# Patient Record
Sex: MALE | Race: WHITE | URBAN - METROPOLITAN AREA
[De-identification: names, ages, dates, MRNs, and addresses within clinical notes are randomized per-mention and may not be internally consistent; named-entity substitution may affect disease eponyms.]

---

## 2018-02-27 ENCOUNTER — TRANSFERRED RECORDS (OUTPATIENT)
Dept: HEALTH INFORMATION MANAGEMENT | Facility: CLINIC | Age: 28
End: 2018-02-27

## 2018-04-05 ENCOUNTER — TRANSFERRED RECORDS (OUTPATIENT)
Dept: HEALTH INFORMATION MANAGEMENT | Facility: CLINIC | Age: 28
End: 2018-04-05

## 2018-05-07 ENCOUNTER — TRANSFERRED RECORDS (OUTPATIENT)
Dept: HEALTH INFORMATION MANAGEMENT | Facility: CLINIC | Age: 28
End: 2018-05-07

## 2018-06-28 ENCOUNTER — TRANSFERRED RECORDS (OUTPATIENT)
Dept: HEALTH INFORMATION MANAGEMENT | Facility: CLINIC | Age: 28
End: 2018-06-28

## 2018-08-14 ENCOUNTER — DOCUMENTATION ONLY (OUTPATIENT)
Dept: GASTROENTEROLOGY | Facility: CLINIC | Age: 28
End: 2018-08-14

## 2018-08-14 NOTE — PROGRESS NOTES
GI notes or primary provider notes related to GI problem:   GI Note - [facility] [place] tab and Care Everywhere     Pathology reports:  Y    Recent Lab  Reports: Y    Radiology Reports (CT?MRI) : N    Endoscopy:  Y    Colonoscopy: Y    Referring GI Physician Name: N/A    Referring PCP Name: N/A        Referral Date: 7/30/18 - records received 8/8/18 - self referral - Chronic diarrhea    Date Complete Records Received and sent for review: 8/14/18    Date records scanned into epic: 8/14/18    Provider Review Date:     Date review routed back to sender:     Letter sent:       Notes:

## 2018-08-31 NOTE — PROGRESS NOTES
REFERRAL REVIEW FORM    Referred by: patient    Reason for referral: chronic diarrhea    Date records reviewed: 8/31/2018    Previous work up:    Cscope, egd, GI office visit x 1 (2/2018)    Patient has chronic diarrhea. Duod bx neg for CD; HP and Lynne's esophagus noted; colon bx neg for microscopic colitis. panc elastase normal.     Recommendation:    Schedule with general GI provider     When to schedule:  Next available slot

## 2018-09-17 ENCOUNTER — TELEPHONE (OUTPATIENT)
Dept: GASTROENTEROLOGY | Facility: CLINIC | Age: 28
End: 2018-09-17

## 2018-09-18 ENCOUNTER — OFFICE VISIT (OUTPATIENT)
Dept: GASTROENTEROLOGY | Facility: CLINIC | Age: 28
End: 2018-09-18
Payer: COMMERCIAL

## 2018-09-18 VITALS
TEMPERATURE: 97.9 F | DIASTOLIC BLOOD PRESSURE: 87 MMHG | SYSTOLIC BLOOD PRESSURE: 128 MMHG | BODY MASS INDEX: 29.91 KG/M2 | HEART RATE: 86 BPM | OXYGEN SATURATION: 98 % | RESPIRATION RATE: 16 BRPM | WEIGHT: 220.8 LBS | HEIGHT: 72 IN

## 2018-09-18 DIAGNOSIS — R10.84 ABDOMINAL PAIN, GENERALIZED: ICD-10-CM

## 2018-09-18 DIAGNOSIS — Z71.3 NUTRITIONAL COUNSELING: Primary | ICD-10-CM

## 2018-09-18 DIAGNOSIS — K52.9 CHRONIC DIARRHEA: Primary | ICD-10-CM

## 2018-09-18 DIAGNOSIS — K52.9 CHRONIC DIARRHEA: ICD-10-CM

## 2018-09-18 DIAGNOSIS — R79.89 ABNORMAL LIVER FUNCTION TESTS: ICD-10-CM

## 2018-09-18 DIAGNOSIS — R19.5 LOOSE STOOLS: ICD-10-CM

## 2018-09-18 LAB
ALBUMIN SERPL-MCNC: 3.9 G/DL (ref 3.4–5)
ALP SERPL-CCNC: 90 U/L (ref 40–150)
ALT SERPL W P-5'-P-CCNC: 184 U/L (ref 0–70)
ANION GAP SERPL CALCULATED.3IONS-SCNC: 6 MMOL/L (ref 3–14)
AST SERPL W P-5'-P-CCNC: 107 U/L (ref 0–45)
BASOPHILS # BLD AUTO: 0 10E9/L (ref 0–0.2)
BASOPHILS NFR BLD AUTO: 0.5 %
BILIRUB DIRECT SERPL-MCNC: 0.1 MG/DL (ref 0–0.2)
BILIRUB SERPL-MCNC: 0.5 MG/DL (ref 0.2–1.3)
BUN SERPL-MCNC: 18 MG/DL (ref 7–30)
CALCIUM SERPL-MCNC: 9 MG/DL (ref 8.5–10.1)
CHLORIDE SERPL-SCNC: 106 MMOL/L (ref 94–109)
CO2 SERPL-SCNC: 26 MMOL/L (ref 20–32)
CREAT SERPL-MCNC: 1.26 MG/DL (ref 0.66–1.25)
CRP SERPL-MCNC: 3.3 MG/L (ref 0–8)
DEPRECATED CALCIDIOL+CALCIFEROL SERPL-MC: 28 UG/L (ref 20–75)
DIFFERENTIAL METHOD BLD: NORMAL
EOSINOPHIL # BLD AUTO: 0.1 10E9/L (ref 0–0.7)
EOSINOPHIL NFR BLD AUTO: 1.2 %
ERYTHROCYTE [DISTWIDTH] IN BLOOD BY AUTOMATED COUNT: 12.4 % (ref 10–15)
ERYTHROCYTE [SEDIMENTATION RATE] IN BLOOD BY WESTERGREN METHOD: 8 MM/H (ref 0–15)
FERRITIN SERPL-MCNC: 95 NG/ML (ref 26–388)
FOLATE SERPL-MCNC: 12.6 NG/ML
GFR SERPL CREATININE-BSD FRML MDRD: 67 ML/MIN/1.7M2
GLUCOSE SERPL-MCNC: 123 MG/DL (ref 70–99)
HAV IGG SER QL IA: NONREACTIVE
HBV CORE AB SERPL QL IA: NONREACTIVE
HBV SURFACE AB SERPL IA-ACNC: 20.45 M[IU]/ML
HBV SURFACE AG SERPL QL IA: NONREACTIVE
HCT VFR BLD AUTO: 42.5 % (ref 40–53)
HCV AB SERPL QL IA: NONREACTIVE
HGB BLD-MCNC: 14.3 G/DL (ref 13.3–17.7)
IGA SERPL-MCNC: 119 MG/DL (ref 70–380)
IMM GRANULOCYTES # BLD: 0 10E9/L (ref 0–0.4)
IMM GRANULOCYTES NFR BLD: 0.4 %
LIPASE SERPL-CCNC: 113 U/L (ref 73–393)
LYMPHOCYTES # BLD AUTO: 0.8 10E9/L (ref 0.8–5.3)
LYMPHOCYTES NFR BLD AUTO: 9.9 %
MCH RBC QN AUTO: 30.2 PG (ref 26.5–33)
MCHC RBC AUTO-ENTMCNC: 33.6 G/DL (ref 31.5–36.5)
MCV RBC AUTO: 90 FL (ref 78–100)
MONOCYTES # BLD AUTO: 0.4 10E9/L (ref 0–1.3)
MONOCYTES NFR BLD AUTO: 4.8 %
NEUTROPHILS # BLD AUTO: 6.8 10E9/L (ref 1.6–8.3)
NEUTROPHILS NFR BLD AUTO: 83.2 %
NRBC # BLD AUTO: 0 10*3/UL
NRBC BLD AUTO-RTO: 0 /100
PLATELET # BLD AUTO: 196 10E9/L (ref 150–450)
POTASSIUM SERPL-SCNC: 4.4 MMOL/L (ref 3.4–5.3)
PROT SERPL-MCNC: 7.8 G/DL (ref 6.8–8.8)
RBC # BLD AUTO: 4.74 10E12/L (ref 4.4–5.9)
SODIUM SERPL-SCNC: 137 MMOL/L (ref 133–144)
TSH SERPL DL<=0.005 MIU/L-ACNC: 2.18 MU/L (ref 0.4–4)
VIT B12 SERPL-MCNC: 1023 PG/ML (ref 193–986)
WBC # BLD AUTO: 8.2 10E9/L (ref 4–11)

## 2018-09-18 RX ORDER — CALCIUM CARBONATE 500 MG/1
TABLET, CHEWABLE ORAL
COMMUNITY

## 2018-09-18 RX ORDER — ACETAMINOPHEN 325 MG/1
TABLET ORAL
COMMUNITY

## 2018-09-18 ASSESSMENT — ENCOUNTER SYMPTOMS
ARTHRALGIAS: 1
INSOMNIA: 1
HALLUCINATIONS: 0
FATIGUE: 1
EYE WATERING: 0
INCREASED ENERGY: 1
DYSPNEA ON EXERTION: 0
WEIGHT LOSS: 0
CHILLS: 1
SINUS CONGESTION: 1
TASTE DISTURBANCE: 0
VOMITING: 0
POSTURAL DYSPNEA: 0
SPUTUM PRODUCTION: 0
MUSCLE CRAMPS: 1
NECK MASS: 0
POLYPHAGIA: 0
WHEEZING: 1
EYE REDNESS: 0
HOARSE VOICE: 1
RECTAL PAIN: 0
SNORES LOUDLY: 1
NAUSEA: 1
WEIGHT GAIN: 0
BLOATING: 0
DECREASED APPETITE: 0
NECK PAIN: 1
SORE THROAT: 1
HEMOPTYSIS: 0
COUGH: 1
BLOOD IN STOOL: 0
POLYDIPSIA: 0
NIGHT SWEATS: 0
STIFFNESS: 1
MUSCLE WEAKNESS: 1
CONSTIPATION: 0
TROUBLE SWALLOWING: 1
SMELL DISTURBANCE: 0
DECREASED CONCENTRATION: 1
MYALGIAS: 1
COUGH DISTURBING SLEEP: 0
DOUBLE VISION: 0
SHORTNESS OF BREATH: 0
DIARRHEA: 1
JAUNDICE: 0
ALTERED TEMPERATURE REGULATION: 1
HEARTBURN: 1
PANIC: 1
JOINT SWELLING: 1
BOWEL INCONTINENCE: 0
DEPRESSION: 1
SINUS PAIN: 1
EYE IRRITATION: 0
FEVER: 0
BACK PAIN: 1
EYE PAIN: 0
ABDOMINAL PAIN: 0
NERVOUS/ANXIOUS: 1

## 2018-09-18 ASSESSMENT — PATIENT HEALTH QUESTIONNAIRE - PHQ9
SUM OF ALL RESPONSES TO PHQ QUESTIONS 1-9: 13
10. IF YOU CHECKED OFF ANY PROBLEMS, HOW DIFFICULT HAVE THESE PROBLEMS MADE IT FOR YOU TO DO YOUR WORK, TAKE CARE OF THINGS AT HOME, OR GET ALONG WITH OTHER PEOPLE: SOMEWHAT DIFFICULT
SUM OF ALL RESPONSES TO PHQ QUESTIONS 1-9: 13

## 2018-09-18 ASSESSMENT — PAIN SCALES - GENERAL: PAINLEVEL: NO PAIN (0)

## 2018-09-18 NOTE — PROGRESS NOTES
"[facility] Outpatient Medical Nutrition Therapy      Time Spent:  60 minutes  Session Type:  Initial Individual Session  Referring Physician:  Dr. Simon Pérez  Reason for RD Visit:  Intermittent abdominal pain and loose stool, possible IBS per conversation with MD.     Nutrition Assessment:  Patient is here with his wife and 6-month old son for initial visit with Registered Dietitian (RD).  Patient is a 28 y.o. male with history of CKD, H. Pylori and intermittent abdominal pain and loose stool for the past 5 years. Patient stated that he will go through periods where he has pain and loose stool over a variable amount of time. For example may have these issues for 3 days, 1 week, 1 month or 3 months and then will be fine for a period of time. He reported that his symptoms are not painful as much as uncomfortable. His stomach starts \"rolling\"/gurgling and he gets sweaty, needs to stand up and then needs to have bowel movement. He stated that once he has a bowel movement, he does feel better. He stated that occasionally he has stool that looks oily. He stated that he and his wife have noticed that when he eats pasta at a restaurant, this tends to be a trigger for his GI symptoms. On further assessment, he tends to have corine sauce on pasta at a restaurant over tomato sauce. In general they have noticed that he does tends to have GI issues/these symptoms when eating out but also these periods of loose stool can happen at any time.     Patient stated that he tends to skip breakfast but if he does eat something for breakfast during the week, then has a muffin or donuts. Once per week on weekends will go out to eat for breakfast. He tends to have fast food for lunch meals. 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 Pt stated that he tends to drink around 1-4 fountain sodas (mellow yellow) per day and about 24 oz water/day and will have about 2-4 " "alcoholic drinks per week.     Height:   Ht Readings from Last 1 Encounters:   09/18/18 1.825 m (5' 11.85\")     Weight:    Wt Readings from Last 5 Encounters:   09/18/18 100.2 kg (220 lb 12.8 oz)      BMI: 30.13    Diet Recall:  (usual intake)  Meal Food    Breakfast Skips OR Muffin or donuts OR sundays: restaurant: eggs, ham,. 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 Occasionally apple pie and ice cream in the evening lately   Beverages 1-4 [place] soda (mellow yellow), 24 oz water/day, 0-2 glasses 1% milk/day   Alcohol Intake On weekends usually 1-2 beers or whiskey with coke drinks      Frequency of eating/taking out meals: tends to eat out for lunch fast food     Labs:  Pt will have labs following visit today.  Pertinent Medications/vitamin and mineral supplements:   Omeprazole and TUMS prn. Pt will be starting citrucel as recommended at MD visit today.  Food Allergies:  NKFA  Physical Activity:  Active as part of workday. No structured exercise.    MALNUTRITION:  % Weight Loss:  None noted  % Intake:  No decreased intake noted  Subcutaneous Fat Loss:  None observed  Muscle Loss:  None observed  Fluid Retention:  None noted    Malnutrition Diagnosis: Patient does not meet two of the above criteria necessary for diagnosing malnutrition  In Context of:  Chronic illness or disease    Nutrition Diagnosis:    Altered GI function related to intermittent issues with abdominal pain/discomfort and loose stool as evidenced by pt report.    Food and nutrition related knowledge deficit related to lack of previous diet education as evidenced by pt report.    Nutrition Prescription: Recommended trial of dairy free diet for 3-4 weeks along with keeping " food and beverage journals and tracking GI symptoms. See all goals below.    Nutrition Intervention:    Nutrition Education/Counseling:  Provided diet education for following a dairy free diet. Reviewed sources of dairy food and beverages and gave tips for eliminating and making substitutions with non dairy foods/beverages based on some of pt's preferences. Discussed other tips and suggestions for meals/snacks/beverages that may be better tolerated. Encouraged pt to eat slowly and take 20-30 minutes to eat a meal and stop eating when satisfied not full, decrease carbonated drinks down to 12 oz or less per day. Also encouraged pt to increase water to drink 48 oz-64 oz per day and discussed tips for naturally flavoring water such as adding in lemon/lime for flavor. Recommended pt not skip meals and eat a more balanced breakfast meal. Explained that skipping may lead to overeating at lunch and dinner meal. Encouraged pt to pack more lunches from home instead of eating out at fast food restaurants. Encouraged him to increase fruit and vegetable intake at each meal. Recommended keep daily food and beverage journals and track all GI symptoms he is experiencing.     Nutrition Education: Provided nutrition education on general digestion of carbohydrates, FODMAP (Fermentable Oligo-saccharides, Di-saccharides, Mono-saccharides And Polyols) foods, impact these short chain carbohydrates on GI tract, Gut microbiota and its effects on fiber. Discussed high and low FODMAP foods. Reviewed FODMAP diet guidelines, including length of the diet,  the different phases of the diet plan with elimination phase and discussed recommendation and schedule for adding foods back in. Explained that pt could follow a modified lower FODMAP diet if preferred. For Modified lower FODMAP diet, review the list of high FODMAP foods and then eliminate 1-2 of those high FODMAP foods you eat daily/most days to see if any improvement in GI issues. (i.e.  onions).    Keep daily food and beverage journals and track all GI symptoms (diarrhea, constipation, belching, bloating, pain) to identify the threshold for High FODMAP foods and/or tolerance or intolerance to foods.     Patient expressed understanding of diet education and interested in focusing on highlighted goals below.    Educational Materials Provided:  Nutrition care manual: Low FODMAP nutrition therapy and fodmap food list. Lactose controlled nutrition therapy as well as lactose free cooking tips.    Patient verbalized understanding of education provided. See all Goals below.     Goals:  -Trial dairy free diet for 3-4 weeks.   -After that 3-4 weeks period, can try adding back in lower lactose dairy products at first such as 1/2 c cottage cheese or 1 oz aged cheese such as swiss or cheddar. Only eat 1 serving of that low lactose dairy food per day for 3-4 days to see if you tolerate or notice any symptoms.  -Keep daily food and beverage journals and track all GI symptoms.  -Decrease carbonated drinks/soda down to no more than one 12 oz drink per day.  -Increase water to drink 48 oz-64 oz (6 oz-8 oz).  -Eat slowly and take 20-30 minutes to eat a meal. Stop eating when satisfied not full.  -Increase fruit and vegetables at meal and aim to have 1-2 servings with each meal.  -Do not skip breakfast meal.  -Can eat 4-6 smaller meals if better tolerated.    At a later time if you do want to follow a low FODMAP diet see below:    Follow a low FODMAP diet. Start with eliminating higher FODMAP foods for 3-4 weeks. Then after 3-4 weeks start adding those higher FODMAP foods back into diet. Start adding 1-2 of your favorite higher FODMAP foods for 3-4 days (eat daily during those 3-4 days). Then add another 1-2 higher FODMAP foods and onward. All along the reintroduction make sure to track GI symptoms daily as well as tracking GI symptoms during the elimination phase.     Or     -For Modified lower FODMAP diet, review  list of high FODMAP foods and then eliminate 1-2 of those high FODMAP foods you eat daily/most days to see if any improvement in GI issues. (ie milk).    Nutrition Monitoring and Evaluation: Will monitor adherence to nutrition recommendations at any future RD visits.     Further Medical Nutrition Therapy:  Recommended when here for other f/u visits or PRN  Next Appointment (if applicable):  Gave pt scheduling info/number  Patient was encouraged to call/contact RD with any further questions.    Naz Arriola, MS, RD, LD

## 2018-09-18 NOTE — LETTER
Patient:  Sathya Quintana  :   1990  MRN:     [id number]        Mr.Brandon ZENAIDA Quintana  [id number] [facility] [id number]        2018    Dear ,    We are writing to inform you of your test results. Overall your initial labs look good. There is no evidence of any nutritional deficiencies and your blood counts look good. Your kidney function is still a little decreased but better than it was previously. Your inflammatory markers are normal. Your blood markers for celiac are negative (this is normal and means you likely do not have celiac disease). Your thyroid test and pancreas tests are normal. Your tests for viral hepatitis are normal.    Two of your liver tests (the ALT and AST) are elevated or abnormal. This can indicate that there is some inflammation in your liver. Sometimes this can be due to fat in your liver or another process. I recommend we get an ultrasound of your liver and some additional blood tests when you are back in the [facility] to get the rest of your tests to further evaluate this.    If you have any questions please do not hesitate to call my nurse coordinator, Sonia Banda, at [phone].    Sincerely,  Simon Pérez MD    [facility]  Division of Gastroenterology, Hepatology and Nutrition      Resulted Orders   CBC with platelets differential   Result Value Ref Range    WBC 8.2 4.0 - 11.0 10e9/L    RBC Count 4.74 4.4 - 5.9 10e12/L    Hemoglobin 14.3 13.3 - 17.7 g/dL    Hematocrit 42.5 40.0 - 53.0 %    MCV 90 78 - 100 fl    MCH 30.2 26.5 - 33.0 pg    MCHC 33.6 31.5 - 36.5 g/dL    RDW 12.4 10.0 - 15.0 %    Platelet Count 196 150 - 450 10e9/L    Diff Method Automated Method     % Neutrophils 83.2 %    % Lymphocytes 9.9 %    % Monocytes 4.8 %    % Eosinophils 1.2 %    % Basophils 0.5 %    % Immature Granulocytes 0.4 %    Nucleated RBCs 0 0 /100    Absolute Neutrophil 6.8 1.6 - 8.3 10e9/L    Absolute Lymphocytes 0.8 0.8 -  5.3 10e9/L    Absolute Monocytes 0.4 0.0 - 1.3 10e9/L    Absolute Eosinophils 0.1 0.0 - 0.7 10e9/L    Absolute Basophils 0.0 0.0 - 0.2 10e9/L    Abs Immature Granulocytes 0.0 0 - 0.4 10e9/L    Absolute Nucleated RBC 0.0    Lipase   Result Value Ref Range    Lipase 113 73 - 393 U/L   Erythrocyte sedimentation rate auto   Result Value Ref Range    Sed Rate 8 0 - 15 mm/h   CRP inflammation   Result Value Ref Range    CRP Inflammation 3.3 0.0 - 8.0 mg/L   Ferritin   Result Value Ref Range    Ferritin 95 26 - 388 ng/mL   Hepatic panel   Result Value Ref Range    Bilirubin Direct 0.1 0.0 - 0.2 mg/dL    Bilirubin Total 0.5 0.2 - 1.3 mg/dL    Albumin 3.9 3.4 - 5.0 g/dL    Protein Total 7.8 6.8 - 8.8 g/dL    Alkaline Phosphatase 90 40 - 150 U/L     (H) 0 - 70 U/L     (H) 0 - 45 U/L   Vitamin D Deficiency   Result Value Ref Range    Vitamin D Deficiency screening 28 20 - 75 ug/L      Comment:      Season, race, dietary intake, and treatment affect the concentration of   25-hydroxy-Vitamin D. Values may decrease during winter months and increase   during summer months. Values 20-29 ug/L may indicate Vitamin D insufficiency   and values <20 ug/L may indicate Vitamin D deficiency.  Vitamin D determination is routinely performed by an immunoassay specific for   25 hydroxyvitamin D3.  If an individual is on vitamin D2 (ergocalciferol)   supplementation, please specify 25 OH vitamin D2 and D3 level determination by   LCMSMS test [id number].     Vitamin B12   Result Value Ref Range    Vitamin B12 1023 (H) 193 - 986 pg/mL   Folate   Result Value Ref Range    Folate 12.6 >5.4 ng/mL   TSH   Result Value Ref Range    TSH 2.18 0.40 - 4.00 mU/L   Tissue transglutaminase rizwan IgA and IgG   Result Value Ref Range    Tissue Transglutaminase Antibody IgA <1 <7 U/mL      Comment:      Negative  The tTG-IgA assay has limited utility for patients with decreased levels of   IgA. Screening for celiac disease should include IgA testing to  rule out   selective IgA deficiency and to guide selection and interpretation of   serological testing. tTG-IgG testing may be positive in celiac disease   patients with IgA deficiency.      Tissue Transglutaminase [place] IgG <1 <7 U/mL      Comment:      Negative   IgA   Result Value Ref Range     70 - 380 mg/dL   Hepatitis A Antibody IgG   Result Value Ref Range    Hepatitis A Antibody IgG Nonreactive NR^Nonreactive      Comment:      This assay cannot be used for the diagnosis of acute HAV infection.   Hepatitis B Surface Antibody   Result Value Ref Range    Hepatitis B Surface Antibody 20.45 (H) <8.00 m[IU]/mL      Comment:      Reactive, Patient is considered to be immune to infection with hepatitis B   when the value is greater than or equal to 12.0 m[IU]/mL.     Hepatitis B surface antigen   Result Value Ref Range    Hep B Surface Agn Nonreactive NR^Nonreactive   Hepatitis C antibody   Result Value Ref Range    Hepatitis C Antibody Nonreactive NR^Nonreactive      Comment:      Assay performance characteristics have not been established for newborns,   infants, and children

## 2018-09-18 NOTE — LETTER
Date:September 28, 2018      Patient was self referred, no letter generated. Do not send.        [facility] Information

## 2018-09-18 NOTE — LETTER
9/18/2018       RE: Sathya Quintana  [id number] [place] [address]     Dear Colleague,    Thank you for referring your patient, Sathya Quintana, to the [facility] GASTROENTEROLOGY AND IBD CLINIC at [facility]. Please see a copy of my visit note below.    Note dictated. Job code [id number].    Simon Pérez MD    [facility]  Division of Gastroenterology, Hepatology and Nutrition    Answers for HPI/ROS submitted by the patient on 9/18/2018   General Symptoms: Yes  Skin Symptoms: No  HENT Symptoms: Yes  EYE SYMPTOMS: Yes  HEART SYMPTOMS: No  LUNG SYMPTOMS: Yes  INTESTINAL SYMPTOMS: Yes  URINARY SYMPTOMS: No  REPRODUCTIVE SYMPTOMS: No  SKELETAL SYMPTOMS: Yes  BLOOD SYMPTOMS: No  NERVOUS SYSTEM SYMPTOMS: No  MENTAL HEALTH SYMPTOMS: Yes  Fever: No  Loss of appetite: No  Weight loss: No  Weight gain: No  Fatigue: Yes  Night sweats: No  Chills: Yes  Increased stress: Yes  Excessive hunger: No  Excessive thirst: No  Feeling hot or cold when others believe the temperature is normal: Yes  Loss of height: No  Post-operative complications: No  Surgical site pain: No  Hallucinations: No  Change in or Loss of Energy: Yes  Hyperactivity: No  Confusion: Yes  Ear pain: Yes  Ear discharge: No  Hearing loss: Yes  Tinnitus: No  Nosebleeds: No  Congestion: Yes  Sinus pain: Yes  Trouble swallowing: Yes   Voice hoarseness: Yes  Mouth sores: Yes  Sore throat: Yes  Tooth pain: No  Gum tenderness: No  Bleeding gums: No  Change in taste: No  Change in sense of smell: No  Dry mouth: No  Hearing aid used: No  Neck lump: No  Eye pain: No  Vision loss: Yes  Dry eyes: No  Watery eyes: No  Eye bulging: No  Double vision: No  Flashing of lights: No  Spots: No  Floaters: No  Redness: No  Crossed eyes: No  Tunnel Vision: No  Yellowing of eyes: No  Eye irritation: No  Cough: Yes  Sputum or phlegm: No  Coughing up blood: No  Difficulty breating or shortness of breath:  No  Snoring: Yes  Wheezing: Yes  Difficulty breathing on exertion: No  Nighttime Cough: No  Difficulty breathing when lying flat: No  Heart burn or indigestion: Yes  Nausea: Yes  Vomiting: No  Abdominal pain: No  Bloating: No  Constipation: No  Diarrhea: Yes  Blood in stool: No  Black stools: No  Rectal or Anal pain: No  Fecal incontinence: No  Yellowing of skin or eyes: No  Vomit with blood: No  Change in stools: Yes  Back pain: Yes  Muscle aches: Yes  Neck pain: Yes  Swollen joints: Yes  Joint pain: Yes  Bone pain: Yes  Muscle cramps: Yes  Muscle weakness: Yes  Joint stiffness: Yes  Bone fracture: No  Nervous or Anxious: Yes  Depression: Yes  Trouble sleeping: Yes  Trouble thinking or concentrating: Yes  Mood changes: Yes  Panic attacks: Yes  PHQ-2 Score: 4  If you checked off any problems, how difficult have these problems made it for you to do your work, take care of things at home, or get along with other people?: Somewhat difficult  PHQ9 TOTAL SCORE: 13      OUTPATIENT GI CONSULT    Service Date: 09/18/2018      REFERRING PROVIDER:  Yogesh Quintana MD      REASON FOR REFERRAL:  Five years of intermittent loose stools.      CHIEF COMPLAINT:  Five years of intermittent lose stools.       HISTORY OF PRESENT ILLNESS:  Sathya is a very pleasant 28-year-old gentleman with a history of CKD from unknown cause who is here for a second opinion regarding 5 years of intermittent loose and urgent stools.      The patient notes that he remembers the day his symptoms began 5 years ago.  He and his wife were visiting his in-laws in [place], and they went out to eat at a restaurant in [facility].  He ate a cheeseburger, and he noticed that after he ate the cheeseburger, he had relatively 2 urgent and explosive bowel movements that were loose and watery.  He has noted ever since then he will have these intermittent loose stools.  They will come and go, and there will be periods of time with normal bowel  movements in between.  For example, he will have anywhere from 1-2 weeks where things will be normal, followed by anywhere from 1-6 weeks where he will be having more loose stools.  When things are normal, he will have 1 [place] stool type 4 per day.  When things are not going well, he will have 1-2 [place] type 7 stools per day.  He typically does not have nocturnal stools.  He has never had any blood in them.  When stools are more watery sometimes he will see that there is an oily film that separates from his stool and rises to the surface of the water.  Symptoms seem to be worse when he undergoes periods of stress, namely if he is in a new area where he does not know where the bathroom is or if he is traveling (ie. coming down to the [facility]).  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      He notes times when he will have urgent BMs after eating.  He has not been able to pinpoint any particular foods that it happens with, as it seems to happen with most foods.  However, his wife does note that he drinks quite a bit of soda pop, particularly Benedicto Yello (at least 1 large bottle a day).  He also does go through episodes when he will drink more milk.  Some days, he can have 2 large glasses of milk per day, and he will do that for an extended period of time.  Other days and other weeks, he may not drink much milk.  He has not noticed that this correlates directly with his symptoms in one way or another.      He denies any weight loss; and in fact, he has gained some weight recently.  He denies any nausea or vomiting.  He does have daily reflux.  He has been on omeprazole in the past, but we will talk about this later.  He denies any rashes, mouth sores, eye pain or eye redness.  No joint pains.      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  He urinated in the bathroom and then felt lightheaded or dizzy and went to lie down in the living room.  His wife then found him and he was somewhat disoriented. She brought him into the bathroom where he had an episode of incontinence.  Shortly after that, he did regain his orientation and felt better.  He has not had any episodes of that since then.  His PCP has evaluated this. He was found to be bradycardic on a loop recorder, and he was diagnosed with a vasovagal episode.      After determining that symptoms are worse with stress, he has recently started seeing a counselor near home to talk about the stress in his life.      He did see a local GI physician in the [facility].  He had stool studies that were unremarkable including infectious studies that were negative.  He did have a spot fecal fat test checked that was elevated, and he had a normal pancreas elastase.  He also had an endoscopy which showed grade B esophagitis.  Biopsies of the irregular Z-line did show some intestinal metaplasia, but he does remember if he was told that he had Lynne's esophagus.  Duodenal biopsies showed no evidence of celiac.  He had a colonoscopy with a normal ileum and a normal colon.  Biopsies of the colon were normal.      He has not tried any fiber.  He did try a little bit of a probiotic but did not take it regularly.  He notes that alcohol does not seem to make his symptoms worse.  He states that he does not drink heavily.      His wife believes that much of this is food intolerance and is related to his diet.      Currently, he has been having a pretty good stretch and feels that he is in a doing well now.      REVIEW OF SYSTEMS:  A complete review of systems was performed.  Pertinent positives and negatives are as stated above HPI.  The remainder of a complete review of systems is unremarkable.      PAST MEDICAL HISTORY:   1.  CKD.  He has been seen by nephrologist in the [facility]  system.  No etiology has been found.  He had worsening of his renal failure when he was treated with triple therapy for H. pylori, and his creatinine seems to be back to baseline at 1.5 now.  His nephrologist hypothesized this was maybe from dehydration related to looser stools from his antibiotic regimen for the H. pylori.   2.  H. pylori.  This was found and biopsies of the stomach.  He is status post treatment with triple therapy, which he did not tolerate very well - this caused some diarrhea and nausea which led to dehydration. This resolved with cessation of therapy   3.  Chronic loose stools as stated above.      PAST SURGICAL HISTORY:  None.      MEDICATIONS:  No regular medications.     1.  He formerly took Advil intermittently and Aleve for headaches, although he has been counseled against this.   2.  Acetaminophen as needed for pain.      FAMILY HISTORY:  Mother has a unknown history of liver issues.  He does not know any details.  A sister with cancer of the kidney, again he does not know any details.  The whole family has heartburn.  No history of Crohn's or ulcerative colitis.  No history of colon cancer or colon polyps.  No history of stomach cancer, esophageal cancer.  No history of celiac that he is aware of.      SOCIAL HISTORY: He is a romero, and he plants corn and beans.  He is accompanied by his wife, Alvin, today and her baby Meeker.  He drinks socially alcohol.  He does not smoke.  He does not use marijuana.      PHYSICAL EXAMINATION:   VITAL SIGNS:  Temperature is 97.9, blood pressure 128/87, pulse 86, respirations 16, satting 98% on room air.   GENERAL:  He is pleasant, in no acute distress.   HEENT:  Head is atraumatic, normocephalic.  Sclerae are clear, anicteric without injection.  Oropharynx is clear with moist mucous membranes.   NECK:  Supple.  There is no lymphadenopathy, no thyromegaly.   LUNGS:  Clear to auscultation.   HEART:  Normal rate.   ABDOMEN:  Soft, nontender,  nondistended, no rebound or guarding.   EXTREMITIES:  No clubbing, cyanosis or edema.   SKIN:  No evidence of rash.   JOINTS:  No synovitis.   NEUROLOGIC:  Awake, alert and oriented x3 with no focal deficits.      LABORATORY DATA:  Reviewed in Care Everywhere.  Fecal fat mildly elevated, although his pancreatic elastase is normal.  CBC is normal.  Differential is normal.  His UA is unremarkable.  He did have mildly elevated protein in his urine.  His INR has been 1.2.  His metabolic panel showed a creatinine baseline 1.5 which has been as high as 1.9, albumin is 4.5, ALT was mildly elevated at 87 in May, AST is normal, alkaline phosphatase is normal, bilirubin has been normal, enteric panel was normal, colonoscopy and endoscopy as stated above.      ASSESSMENT AND PLAN:  Sathya is a 28-year-old gentleman with 5 years of intermittent urgent, loose stools.      1.  Intermittent loose stools with urgency.  Overall, I think his symptoms are most consistent with irritable bowel syndrome compounded food intolerances.  It very well may be that he had a trigger for this irritable bowel syndrome 5 years ago while traveling to the [facility].  He may have had unspecified food poisoning or viral gastroenteritis that subsequently triggered post-infectious IBS.  To that end, we will make sure we are maximizing treatment for IBS.  He will start on fiber and titrate this up to 2-3 tablespoons per day.  He will take Align probiotic regularly.  We will have him meet with the nutritionist today to discuss food triggers.  Notably, he does drink quite a bit of milk and soda.  We will  him on avoiding this.  She will also talk to him about a low FODMAP diet.  He can consider taking Imodium when he is traveling; however, it does not sound like his symptoms are too severe.      Given the fact that he does complain of some oily stools and the fact that he had a spot fecal fat that was elevated, I think this will require a little bit of  a further evaluation.  We will arrange a 24-hour fecal fat collection in addition to evaluating the type of fats in his stool (split fats vs neutral fats).  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  We will also check an MR enterography to look at his small bowel and make sure there is no evidence of inflammatory bowel disease, though this is less likely given that his EGD and colonoscopy are negative.  We will repeat his celiac serologies to make sure there is no evidence of celiac.      As stressors seem to be playing a big role in his symptoms, we will have him meet with our GI psychologist to discuss how his symptoms could be related to stress.     I do note that he has an unspecified CKD for which he has seen nephrology. Also, he has had mildly abnormal LFTs. I am not sure if his loose stools are in anyway related to these other findings in a unifying diagnosis though this is noted. Celiac disease could be a unifying diagnosis but his duodenal biopsies were negative for this.    2.  Possible Lynne's esophagus in the setting of reflux esophagus.  Typically, I would recommend for him to be on PPI for 2 months and then repeat an EGD with biopsies to confirm the presence of Lynne's esophagus or refute it.  Given his CKD, I will have him make sure he talks with his nephrologists to get their okay about starting omeprazole.  If he gets the okay, we will have him do the omeprazole 20 mg daily for 2 months and then repeat an endoscopy.     3.  H. pylori.  I do not think this was contributing to his loose stools in any way.  This is likely an incidental finding.  He had a difficult time with triple therapy.  We will check H. pylori stool antigen to make sure that we have confirmed eradication.      I spent quite a bit of time discussing all of this with the patient.  We answered all questions and encouraged him to call if he has any further questions in  the future.      I did discuss with the patient that I was leaving the [facility] in approximately 2 months.  I will be happy to do his EGD before that time, but if he continues to be seen here at the [facility], he will need to follow up with one of our midlevel providers or with another faculty  provider.  The patient understands this.      Thank you very much for the opportunity to take part in the care of this patient.  Please do not hesitate to call with questions.         SIMON LILLY MD             D: 2018   T: 2018   MT: BRENT      Name:     DALLIN NINO   MRN:      [id number]        Account:      [id number]   :      1990           Service Date: 2018      Document: [id number]      ADDENDUM:  LFTs noted to be more abnormal -  and . We will start work up with labs and US with dopplers. He will get this checked when he returns for his MRE. Further recommendations to follow these results.    Simon Lilly MD    [facility]  Division of Gastroenterology, Hepatology and Nutrition        Again, thank you for allowing me to participate in the care of your patient.      Sincerely,    Simon Lilly MD

## 2018-09-18 NOTE — LETTER
"9/18/2018       RE: Sathya Quintana  [address] [place] [address]     Dear Colleague,    Thank you for referring your patient, Sathya Quintana, to the [facility] GASTROENTEROLOGY AND IBD CLINIC at [facility]. Please see a copy of my visit note below.    [facility] Outpatient Medical Nutrition Therapy      Time Spent:  60 minutes  Session Type:  Initial Individual Session  Referring Physician:  Dr. Simon Pérez  Reason for RD Visit:  Intermittent abdominal pain and loose stool, possible IBS per conversation with MD.     Nutrition Assessment:  Patient is here with his wife and 6-month old son for initial visit with Registered Dietitian (RD).  Patient is a 28 y.o. male with history of CKD, H. Pylori and intermittent abdominal pain and loose stool for the past 5 years. Patient stated that he will go through periods where he has pain and loose stool over a variable amount of time. For example may have these issues for 3 days, 1 week, 1 month or 3 months and then will be fine for a period of time. He reported that his symptoms are not painful as much as uncomfortable. His stomach starts \"rolling\"/gurgling and he gets sweaty, needs to stand up and then needs to have bowel movement. He stated that once he has a bowel movement, he does feel better. He stated that occasionally he has stool that looks oily. He stated that he and his wife have noticed that when he eats pasta at a restaurant, this tends to be a trigger for his GI symptoms. On further assessment, he tends to have corine sauce on pasta at a restaurant over tomato sauce. In general they have noticed that he does tends to have GI issues/these symptoms when eating out but also these periods of loose stool can happen at any time.     Patient stated that he tends to skip breakfast but if he does eat something for breakfast during the week, then has a muffin or donuts. Once per week on weekends will go out to eat for " "breakfast. He tends to have fast food for lunch meals. 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 Pt stated that he tends to drink around 1-4 fountain sodas (mellow yellow) per day and about 24 oz water/day and will have about 2-4 alcoholic drinks per week.     Height:   Ht Readings from Last 1 Encounters:   09/18/18 1.825 m (5' 11.85\")     Weight:    Wt Readings from Last 5 Encounters:   09/18/18 100.2 kg (220 lb 12.8 oz)      BMI: 30.13    Diet Recall:  (usual intake)  Meal Food    Breakfast Skips OR Muffin or donuts OR sundays: restaurant: eggs, ham,. 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 Occasionally apple pie and ice cream in the evening lately   Beverages 1-4 [place] soda (mellow yellow), 24 oz water/day, 0-2 glasses 1% milk/day   Alcohol Intake On weekends usually 1-2 beers or whiskey with coke drinks      Frequency of eating/taking out meals: tends to eat out for lunch fast food     Labs:  Pt will have labs following visit today.  Pertinent Medications/vitamin and mineral supplements:   Omeprazole and TUMS prn. Pt will be starting citrucel as recommended at MD visit today.  Food Allergies:  NKFA  Physical Activity:  Active as part of workday. No structured exercise.    MALNUTRITION:  % Weight Loss:  None noted  % Intake:  No decreased intake noted  Subcutaneous Fat Loss:  None observed  Muscle Loss:  None observed  Fluid Retention:  None noted    Malnutrition Diagnosis: Patient does not meet two of the above criteria necessary for diagnosing malnutrition  In Context of:  Chronic illness or disease    Nutrition Diagnosis:    Altered GI function related to " intermittent issues with abdominal pain/discomfort and loose stool as evidenced by pt report.    Food and nutrition related knowledge deficit related to lack of previous diet education as evidenced by pt report.    Nutrition Prescription: Recommended trial of dairy free diet for 3-4 weeks along with keeping food and beverage journals and tracking GI symptoms. See all goals below.    Nutrition Intervention:    Nutrition Education/Counseling:  Provided diet education for following a dairy free diet. Reviewed sources of dairy food and beverages and gave tips for eliminating and making substitutions with non dairy foods/beverages based on some of pt's preferences. Discussed other tips and suggestions for meals/snacks/beverages that may be better tolerated. Encouraged pt to eat slowly and take 20-30 minutes to eat a meal and stop eating when satisfied not full, decrease carbonated drinks down to 12 oz or less per day. Also encouraged pt to increase water to drink 48 oz-64 oz per day and discussed tips for naturally flavoring water such as adding in lemon/lime for flavor. Recommended pt not skip meals and eat a more balanced breakfast meal. Explained that skipping may lead to overeating at lunch and dinner meal. Encouraged pt to pack more lunches from home instead of eating out at fast food restaurants. Encouraged him to increase fruit and vegetable intake at each meal. Recommended keep daily food and beverage journals and track all GI symptoms he is experiencing.     Nutrition Education: Provided nutrition education on general digestion of carbohydrates, FODMAP (Fermentable Oligo-saccharides, Di-saccharides, Mono-saccharides And Polyols) foods, impact these short chain carbohydrates on GI tract, Gut microbiota and its effects on fiber. Discussed high and low FODMAP foods. Reviewed FODMAP diet guidelines, including length of the diet,  the different phases of the diet plan with elimination phase and discussed  recommendation and schedule for adding foods back in. Explained that pt could follow a modified lower FODMAP diet if preferred. For Modified lower FODMAP diet, review the list of high FODMAP foods and then eliminate 1-2 of those high FODMAP foods you eat daily/most days to see if any improvement in GI issues. (i.e. onions).    Keep daily food and beverage journals and track all GI symptoms (diarrhea, constipation, belching, bloating, pain) to identify the threshold for High FODMAP foods and/or tolerance or intolerance to foods.     Patient expressed understanding of diet education and interested in focusing on highlighted goals below.    Educational Materials Provided:  Nutrition care manual: Low FODMAP nutrition therapy and fodmap food list. Lactose controlled nutrition therapy as well as lactose free cooking tips.    Patient verbalized understanding of education provided. See all Goals below.     Goals:  -Trial dairy free diet for 3-4 weeks.   -After that 3-4 weeks period, can try adding back in lower lactose dairy products at first such as 1/2 c cottage cheese or 1 oz aged cheese such as swiss or cheddar. Only eat 1 serving of that low lactose dairy food per day for 3-4 days to see if you tolerate or notice any symptoms.  -Keep daily food and beverage journals and track all GI symptoms.  -Decrease carbonated drinks/soda down to no more than one 12 oz drink per day.  -Increase water to drink 48 oz-64 oz (6 oz-8 oz).  -Eat slowly and take 20-30 minutes to eat a meal. Stop eating when satisfied not full.  -Increase fruit and vegetables at meal and aim to have 1-2 servings with each meal.  -Do not skip breakfast meal.  -Can eat 4-6 smaller meals if better tolerated.    At a later time if you do want to follow a low FODMAP diet see below:    Follow a low FODMAP diet. Start with eliminating higher FODMAP foods for 3-4 weeks. Then after 3-4 weeks start adding those higher FODMAP foods back into diet. Start adding 1-2 of  your favorite higher FODMAP foods for 3-4 days (eat daily during those 3-4 days). Then add another 1-2 higher FODMAP foods and onward. All along the reintroduction make sure to track GI symptoms daily as well as tracking GI symptoms during the elimination phase.     Or     -For Modified lower FODMAP diet, review list of high FODMAP foods and then eliminate 1-2 of those high FODMAP foods you eat daily/most days to see if any improvement in GI issues. (ie milk).    Nutrition Monitoring and Evaluation: Will monitor adherence to nutrition recommendations at any future RD visits.     Further Medical Nutrition Therapy:  Recommended when here for other f/u visits or PRN  Next Appointment (if applicable):  Gave pt scheduling info/number  Patient was encouraged to call/contact RD with any further questions.    Naz Arriola, MS, RD, LD        Again, thank you for allowing me to participate in the care of your patient.      Sincerely,    Naz Arriola RD

## 2018-09-18 NOTE — PROGRESS NOTES
OUTPATIENT GI CONSULT    Service Date: 09/18/2018      REFERRING PROVIDER:  Yogesh Quintana MD      REASON FOR REFERRAL:  Five years of intermittent loose stools.      CHIEF COMPLAINT:  Five years of intermittent lose stools.       HISTORY OF PRESENT ILLNESS:  Sathya is a very pleasant 28-year-old gentleman with a history of CKD from unknown cause who is here for a second opinion regarding 5 years of intermittent loose and urgent stools.      The patient notes that he remembers the day his symptoms began 5 years ago.  He and his wife were visiting his in-laws in [place], and they went out to eat at a restaurant in [facility].  He ate a cheeseburger, and he noticed that after he ate the cheeseburger, he had relatively 2 urgent and explosive bowel movements that were loose and watery.  He has noted ever since then he will have these intermittent loose stools.  They will come and go, and there will be periods of time with normal bowel movements in between.  For example, he will have anywhere from 1-2 weeks where things will be normal, followed by anywhere from 1-6 weeks where he will be having more loose stools.  When things are normal, he will have 1 Arroyo stool type 4 per day.  When things are not going well, he will have 1-2 Arroyo type 7 stools per day.  He typically does not have nocturnal stools.  He has never had any blood in them.  When stools are more watery sometimes he will see that there is an oily film that separates from his stool and rises to the surface of the water.  Symptoms seem to be worse when he undergoes periods of stress, namely if he is in a new area where he does not know where the bathroom is or if he is traveling (ie. coming down to the [facility]).  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      He notes times when he will have urgent BMs after eating.  He has not  been able to pinpoint any particular foods that it happens with, as it seems to happen with most foods.  However, his wife does note that he drinks quite a bit of soda pop, particularly Benedicto Yello (at least 1 large bottle a day).  He also does go through episodes when he will drink more milk.  Some days, he can have 2 large glasses of milk per day, and he will do that for an extended period of time.  Other days and other weeks, he may not drink much milk.  He has not noticed that this correlates directly with his symptoms in one way or another.      He denies any weight loss; and in fact, he has gained some weight recently.  He denies any nausea or vomiting.  He does have daily reflux.  He has been on omeprazole in the past, but we will talk about this later.  He denies any rashes, mouth sores, eye pain or eye redness.  No joint pains.      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  He urinated in the bathroom and then felt lightheaded or dizzy and went to lie down in the living room.  His wife then found him and he was somewhat disoriented. She brought him into the bathroom where he had an episode of incontinence.  Shortly after that, he did regain his orientation and felt better.  He has not had any episodes of that since then.  His PCP has evaluated this. He was found to be bradycardic on a loop recorder, and he was diagnosed with a vasovagal episode.      After determining that symptoms are worse with stress, he has recently started seeing a counselor near home to talk about the stress in his life.      He did see a local GI physician in the [facility].  He had stool studies that were unremarkable including infectious studies that were negative.  He did have a spot fecal fat test checked that was elevated, and he had a normal pancreas elastase.  He also had an endoscopy which showed grade B esophagitis.  Biopsies of  the irregular Z-line did show some intestinal metaplasia, but he does remember if he was told that he had Lynne's esophagus.  Duodenal biopsies showed no evidence of celiac.  He had a colonoscopy with a normal ileum and a normal colon.  Biopsies of the colon were normal.      He has not tried any fiber.  He did try a little bit of a probiotic but did not take it regularly.  He notes that alcohol does not seem to make his symptoms worse.  He states that he does not drink heavily.      His wife believes that much of this is food intolerance and is related to his diet.      Currently, he has been having a pretty good stretch and feels that he is in a doing well now.      REVIEW OF SYSTEMS:  A complete review of systems was performed.  Pertinent positives and negatives are as stated above HPI.  The remainder of a complete review of systems is unremarkable.      PAST MEDICAL HISTORY:   1.  CKD.  He has been seen by nephrologist in the [facility].  No etiology has been found.  He had worsening of his renal failure when he was treated with triple therapy for H. pylori, and his creatinine seems to be back to baseline at 1.5 now.  His nephrologist hypothesized this was maybe from dehydration related to looser stools from his antibiotic regimen for the H. pylori.   2.  H. pylori.  This was found and biopsies of the stomach.  He is status post treatment with triple therapy, which he did not tolerate very well - this caused some diarrhea and nausea which led to dehydration. This resolved with cessation of therapy   3.  Chronic loose stools as stated above.      PAST SURGICAL HISTORY:  None.      MEDICATIONS:  No regular medications.     1.  He formerly took Advil intermittently and Aleve for headaches, although he has been counseled against this.   2.  Acetaminophen as needed for pain.      FAMILY HISTORY:  Mother has a unknown history of liver issues.  He does not know any details.  A sister with cancer of the  kidney, again he does not know any details.  The whole family has heartburn.  No history of Crohn's or ulcerative colitis.  No history of colon cancer or colon polyps.  No history of stomach cancer, esophageal cancer.  No history of celiac that he is aware of.      SOCIAL HISTORY: He is a romero, and he plants corn and beans.  He is accompanied by his wife, Alvin, today and her baby [place].  He drinks socially alcohol.  He does not smoke.  He does not use marijuana.      PHYSICAL EXAMINATION:   VITAL SIGNS:  Temperature is 97.9, blood pressure 128/87, pulse 86, respirations 16, satting 98% on room air.   GENERAL:  He is pleasant, in no acute distress.   HEENT:  Head is atraumatic, normocephalic.  Sclerae are clear, anicteric without injection.  Oropharynx is clear with moist mucous membranes.   NECK:  Supple.  There is no lymphadenopathy, no thyromegaly.   LUNGS:  Clear to auscultation.   HEART:  Normal rate.   ABDOMEN:  Soft, nontender, nondistended, no rebound or guarding.   EXTREMITIES:  No clubbing, cyanosis or edema.   SKIN:  No evidence of rash.   JOINTS:  No synovitis.   NEUROLOGIC:  Awake, alert and oriented x3 with no focal deficits.      LABORATORY DATA:  Reviewed in Care Everywhere.  Fecal fat mildly elevated, although his pancreatic elastase is normal.  CBC is normal.  Differential is normal.  His UA is unremarkable.  He did have mildly elevated protein in his urine.  His INR has been 1.2.  His metabolic panel showed a creatinine baseline 1.5 which has been as high as 1.9, albumin is 4.5, ALT was mildly elevated at 87 in May, AST is normal, alkaline phosphatase is normal, bilirubin has been normal, enteric panel was normal, colonoscopy and endoscopy as stated above.      ASSESSMENT AND PLAN:  Sathya is a 28-year-old gentleman with 5 years of intermittent urgent, loose stools.      1.  Intermittent loose stools with urgency.  Overall, I think his symptoms are most consistent with irritable bowel  syndrome compounded food intolerances.  It very well may be that he had a trigger for this irritable bowel syndrome 5 years ago while traveling to the [facility].  He may have had unspecified food poisoning or viral gastroenteritis that subsequently triggered post-infectious IBS.  To that end, we will make sure we are maximizing treatment for IBS.  He will start on fiber and titrate this up to 2-3 tablespoons per day.  He will take Align probiotic regularly.  We will have him meet with the nutritionist today to discuss food triggers.  Notably, he does drink quite a bit of milk and soda.  We will  him on avoiding this.  She will also talk to him about a low FODMAP diet.  He can consider taking Imodium when he is traveling; however, it does not sound like his symptoms are too severe.      Given the fact that he does complain of some oily stools and the fact that he had a spot fecal fat that was elevated, I think this will require a little bit of a further evaluation.  We will arrange a 24-hour fecal fat collection in addition to evaluating the type of fats in his stool (split fats vs neutral fats).  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  We will also check an MR enterography to look at his small bowel and make sure there is no evidence of inflammatory bowel disease, though this is less likely given that his EGD and colonoscopy are negative.  We will repeat his celiac serologies to make sure there is no evidence of celiac.      As stressors seem to be playing a big role in his symptoms, we will have him meet with our GI psychologist to discuss how his symptoms could be related to stress.     I do note that he has an unspecified CKD for which he has seen nephrology. Also, he has had mildly abnormal LFTs. I am not sure if his loose stools are in anyway related to these other findings in a unifying diagnosis though this is noted. Celiac disease could be a  unifying diagnosis but his duodenal biopsies were negative for this.    2.  Possible Lynne's esophagus in the setting of reflux esophagus.  Typically, I would recommend for him to be on PPI for 2 months and then repeat an EGD with biopsies to confirm the presence of Lynne's esophagus or refute it.  Given his CKD, I will have him make sure he talks with his nephrologists to get their okay about starting omeprazole.  If he gets the okay, we will have him do the omeprazole 20 mg daily for 2 months and then repeat an endoscopy.     3.  H. pylori.  I do not think this was contributing to his loose stools in any way.  This is likely an incidental finding.  He had a difficult time with triple therapy.  We will check H. pylori stool antigen to make sure that we have confirmed eradication.      I spent quite a bit of time discussing all of this with the patient.  We answered all questions and encouraged him to call if he has any further questions in the future.      I did discuss with the patient that I was leaving the [facility] in approximately 2 months.  I will be happy to do his EGD before that time, but if he continues to be seen here at the [facility], he will need to follow up with one of our midlevel providers or with another faculty  provider.  The patient understands this.      Thank you very much for the opportunity to take part in the care of this patient.  Please do not hesitate to call with questions.         SIMON LILLY MD             D: 2018   T: 2018   MT: BRENT      Name:     DALLIN NINO   MRN:      [id number]        Account:      [id number]   :      1990           Service Date: 2018      Document: [id number]      ADDENDUM:  LFTs noted to be more abnormal -  and . We will start work up with labs and US with dopplers. He will get this checked when he returns for his MRE. Further recommendations to follow these results.    Simon Lilly MD  Assistant    [facility]  Division of Gastroenterology, Hepatology and Nutrition

## 2018-09-18 NOTE — NURSING NOTE
"Chief Complaint   Patient presents with     Consult     Diarrhea and Abdominal Pain       Vitals:    09/18/18 0743   BP: 128/87   BP Location: Left arm   Patient Position: Sitting   Cuff Size: Adult Large   Pulse: 86   Resp: 16   Temp: 97.9  F (36.6  C)   TempSrc: Oral   SpO2: 98%   Weight: 100.2 kg (220 lb 12.8 oz)   Height: 1.825 m (5' 11.85\")       Body mass index is 30.07 kg/(m^2).      Yancy Pierce LPN                          "

## 2018-09-18 NOTE — MR AVS SNAPSHOT
After Visit Summary   9/18/2018    Sathya Quintana    MRN: [id number]           Patient Information     Date Of Birth          1990        Visit Information        Provider Department      9/18/2018 9:30 AM Naz Arriola RD M [facility]        Care Instructions    It was nice meeting you today:    -Trial dairy free diet for 3-4 weeks.   -After that 3-4 weeks period, can try adding back in lower lactose dairy products at first such as 1/2 c cottage cheese or 1 oz aged cheese such as swiss or cheddar. Only eat 1 serving of that low lactose dairy food per day for 3-4 days to see if you tolerate or notice any symptoms.  -Keep daily food and beverage journals and track all GI symptoms.  -Decrease carbonated drinks/soda down to no more than one 12 oz drink per day.  -Increase water to drink 48 oz-64 oz (6 oz-8 oz).  -Eat slowly and take 20-30 minutes to eat a meal. Stop eating when satisfied not full.  -Increase fruit and vegetables at meal and aim to have 1-2 servings with each meal.  -Do not skip breakfast meal.  -Can eat 4-6 smaller meals if better tolerated.    At a later time if you do want to follow a low FODMAP diet see below:    Follow a low FODMAP diet. Start with eliminating higher FODMAP foods for 3-4 weeks. Then after 3-4 weeks start adding those higher FODMAP foods back into diet. Start adding 1-2 of your favorite higher FODMAP foods for 3-4 days (eat daily during those 3-4 days). Then add another 1-2 higher FODMAP foods and onward. All along the reintroduction make sure to track GI symptoms daily as well as tracking GI symptoms during the elimination phase.     Or     -For Modified lower FODMAP diet, review list of high FODMAP foods and then eliminate 1-2 of those high FODMAP foods you eat daily/most days to see if any improvement in GI issues. (ie milk).    -Keep daily food journals and track all GI symptoms (diarrhea, constipation, belching, bloating,  pain).    If you would like to schedule a follow up appointment with Naz Arriola, Registered Dietitian, please call [phone].                    Follow-ups after your visit        Your next 10 appointments already scheduled     Sep 28, 2018 12:00 PM CDT   MR ENTEROGRAPHY W CONTRAST with UUMR1   [facility] [place], MRI ([facility])    [address] [address] [phone]           Take your medicines as usual, unless your doctor tells you not to. Bring a list of your current medicines to your exam (including vitamins, minerals and over-the-counter drugs).  You may or may not receive intravenous (IV) contrast for this exam pending the discretion of the Radiologist.  You will be asked to drink oral contrast for this exam. You will be given the oral contrast in MRI prior to your exam. Please arrive 60-90 minutes before your exam time to drink the oral contrast. To prepare:   Do not eat or drink for 6 hours before the exam. If you need to take medicine, you may take it with small sips of water. (We may ask you to take liquid medicine as well.)  The MRI machine uses a strong magnet. Please wear clothes without metal (snaps, zippers). A sweatsuit works well, or we may give you a hospital gown.  Please remove any body piercings and hair extensions before you arrive. You will also remove watches, jewelry, hairpins, wallets, dentures, partial dental plates and hearing aids. You may wear contact lenses, and you may be able to wear your rings. We have a safe place to keep your personal items, but it is safer to leave them at home.  If you have any questions, please contact your Imaging Department exam site.            Nov 21, 2018  1:00 PM CST   Upper Endoscopy with Simon Pérez MD   [facility] ([facility])    [address]  [address]  [place] [id number] [phone]            Dec 03, 2018   1:00 PM CST   (Arrive by 12:45 PM)   New Patient Visit with Ivanna Mattson, PhD   [facility] ([facility])    [address] [place] [address] [phone]            Dec 03, 2018  2:20 PM CST   (Arrive by 2:05 PM)   Return Visit with Alvin Patel PA-C   [facility] ([facility])    [address] [place] [address] [phone]              Future tests that were ordered for you today     Open Future Orders        Priority Expected Expires Ordered    MR Enterography w Contrast Routine  9/18/2019 9/18/2018    Basic metabolic panel Routine  9/18/2019 9/18/2018    Hepatitis B core antibody Routine  9/18/2019 9/18/2018    Clostridium difficile toxin B PCR Routine  9/18/2019 9/18/2018    Calprotectin Feces Routine  9/18/2019 9/18/2018    Enteric Bacteria and Virus Panel by FRANKLYN Stool Routine  9/18/2019 9/18/2018    Fat Stool Qual Random Collection Routine  10/18/2018 9/18/2018    Giardia antigen Routine  9/18/2019 9/18/2018    H Pylori antigen stool Routine  10/18/2018 9/18/2018    Ova and Parasite Exam Routine Routine  9/18/2019 9/18/2018    Pancreatic Elastase Fecal Routine  9/18/2019 9/18/2018    Fat Stool Quant Timed Collection Routine  9/19/2019 9/18/2018            Who to contact     Please call your clinic at [phone] to:    Ask questions about your health    Make or cancel appointments    Discuss your medicines    Learn about your test results    Speak to your doctor            Additional Information About Your Visit        MyChart Information     I-lightingt is an electronic gateway that provides easy, online access to your medical records. With Team[employer]ynamix, you can request a clinic appointment, read your test results, renew a prescription or communicate with your care team.     To sign up for I-lightingt visit the website at [email or web address]    You will be asked to enter the access code listed below, as well as some personal information. Please follow the directions to create your username and password.     Your access code is: [email or web address]  Expires: 2018  3:37 PM     Your access code will  in 90 days. If you need help or a new code, please contact your [facility] or call [phone] for assistance.        Care EveryWhere ID     This is your Care EveryWhere ID. This could be used by other organizations to access your [place] medical records  [id number]         Blood Pressure from Last 3 Encounters:   18 128/87    Weight from Last 3 Encounters:   18 100.2 kg (220 lb 12.8 oz)              Today, you had the following     No orders found for display       Primary Care Provider    None Specified       No primary provider on file.        Equal Access to Services     [facility] : Hadii yin Barahona, wadeo washington, chavo saldanaaltayla magana, karina cortes . So [facility] [phone].    ATENCIÓN: Si habla español, tiene a cannon disposición servicios gratuitos de asistencia lingüística. Marthaame al [phone].    We comply with applicable federal civil rights laws and [place] laws. We do not discriminate on the basis of race, color, national origin, age, disability, sex, sexual orientation, or gender identity.            Thank you!     Thank you for choosing [facility]  for your care. Our goal is always to provide you with excellent care. Hearing back from our patients is one way we can continue to improve our services. Please take a few minutes to complete the written survey that you may receive in the mail after your visit with us. Thank you!             Your Updated Medication List - Protect others around you: Learn how to safely use, store and throw away your medicines at www.disposemymeds.org.          This list is accurate as of  9/18/18  9:49 AM.  Always use your most recent med list.                   Brand Name Dispense Instructions for use Diagnosis    acetaminophen 325 MG tablet    TYLENOL     Take by mouth as needed        calcium carbonate 500 MG chewable tablet    TUMS     Take by mouth as needed        omeprazole 20 MG CR capsule    priLOSEC

## 2018-09-18 NOTE — PATIENT INSTRUCTIONS
Good to meet you today.        Schedule upper endoscopy   You are scheduled November 21  Check in time is 12   [facility]  [address]   You will be mailed the instructions  You will receive a call from the pre assessment nurse       Your symptoms do sound consistent with irritable bowel syndrome and food intolerances but we will work to make sure nothing else is going on    Get labs today  Lab tests today at the 1st floor -- you will be notified of results by letter or my chart message in 7-10 days.  You will receive a phone call if more urgent follow up is needed.      Get stool studies (including 24 hour fecal fat)    Schedule MRI of your small intestine  You are scheduled on September 28  Nothing to eat or drink for 6 hours   Check in time is 11   Check in Room 1-327   [facility]   [address]    Schedule visit with nutritionist today with Naz     Schedule visit with GI psychologist    Please ask your nephrologist if it is ok to go on omeprazole 20 mg daily for 2 months (until a repeat upper endoscopy to look at your esophagus again)    I recommend you take 1 tablespoon of citrucel fiber in a glass of water for 7 days, then increase to 2 tablespoons daily in a glass of water. If able you can even increase to 3 tablespoons daily.    Start a probiotic. Align is a good over the counter option    Keep a detailed food and symptom diary for at least 2 weeks. You can discuss this further with the nutritionist. You can also discuss the low FODMAP diet. I recommend also avoiding milk products and sodas (both diet and regular)    Follow up in 2 months      Call with questions   Thanks Sonia Banda RN Care Coordinator for Dr. Pérez   Phone   [phone]     For questions regarding your care Monday through Friday, contact the RN GI care coordinator,  Call   [phone] . Your call will be  returned same day, or if consultation is needed with the provider, it may be following  business day - or you may send a My Chart message.    For medication refills (prescribed by the GI clinic), contact your pharmacy.    For appointment rescheduling/cancellation, contact [phone]     After hours, or if you have an immediate GI concern and cannot wait for a return call, contact the GI Fellow at [phone] and select option #4.

## 2018-09-18 NOTE — PROGRESS NOTES
Note dictated. Job code [id number].    Simon Pérez MD    [facility]  Division of Gastroenterology, Hepatology and Nutrition    Answers for HPI/ROS submitted by the patient on 9/18/2018   General Symptoms: Yes  Skin Symptoms: No  HENT Symptoms: Yes  EYE SYMPTOMS: Yes  HEART SYMPTOMS: No  LUNG SYMPTOMS: Yes  INTESTINAL SYMPTOMS: Yes  URINARY SYMPTOMS: No  REPRODUCTIVE SYMPTOMS: No  SKELETAL SYMPTOMS: Yes  BLOOD SYMPTOMS: No  NERVOUS SYSTEM SYMPTOMS: No  MENTAL HEALTH SYMPTOMS: Yes  Fever: No  Loss of appetite: No  Weight loss: No  Weight gain: No  Fatigue: Yes  Night sweats: No  Chills: Yes  Increased stress: Yes  Excessive hunger: No  Excessive thirst: No  Feeling hot or cold when others believe the temperature is normal: Yes  Loss of height: No  Post-operative complications: No  Surgical site pain: No  Hallucinations: No  Change in or Loss of Energy: Yes  Hyperactivity: No  Confusion: Yes  Ear pain: Yes  Ear discharge: No  Hearing loss: Yes  Tinnitus: No  Nosebleeds: No  Congestion: Yes  Sinus pain: Yes  Trouble swallowing: Yes   Voice hoarseness: Yes  Mouth sores: Yes  Sore throat: Yes  Tooth pain: No  Gum tenderness: No  Bleeding gums: No  Change in taste: No  Change in sense of smell: No  Dry mouth: No  Hearing aid used: No  Neck lump: No  Eye pain: No  Vision loss: Yes  Dry eyes: No  Watery eyes: No  Eye bulging: No  Double vision: No  Flashing of lights: No  Spots: No  Floaters: No  Redness: No  Crossed eyes: No  Tunnel Vision: No  Yellowing of eyes: No  Eye irritation: No  Cough: Yes  Sputum or phlegm: No  Coughing up blood: No  Difficulty breating or shortness of breath: No  Snoring: Yes  Wheezing: Yes  Difficulty breathing on exertion: No  Nighttime Cough: No  Difficulty breathing when lying flat: No  Heart burn or indigestion: Yes  Nausea: Yes  Vomiting: No  Abdominal pain: No  Bloating: No  Constipation: No  Diarrhea: Yes  Blood in stool: No  Black stools: No  Rectal or  Anal pain: No  Fecal incontinence: No  Yellowing of skin or eyes: No  Vomit with blood: No  Change in stools: Yes  Back pain: Yes  Muscle aches: Yes  Neck pain: Yes  Swollen joints: Yes  Joint pain: Yes  Bone pain: Yes  Muscle cramps: Yes  Muscle weakness: Yes  Joint stiffness: Yes  Bone fracture: No  Nervous or Anxious: Yes  Depression: Yes  Trouble sleeping: Yes  Trouble thinking or concentrating: Yes  Mood changes: Yes  Panic attacks: Yes  PHQ-2 Score: 4  If you checked off any problems, how difficult have these problems made it for you to do your work, take care of things at home, or get along with other people?: Somewhat difficult  PHQ9 TOTAL SCORE: 13

## 2018-09-18 NOTE — LETTER
Date:September 19, 2018      Patient was self referred, no letter generated. Do not send.        [facility] Health Information

## 2018-09-18 NOTE — MR AVS SNAPSHOT
After Visit Summary   9/18/2018    Sathya Quintana    MRN: [id number]           Patient Information     Date Of Birth          1990        Visit Information        Provider Department      9/18/2018 7:30 AM Simon Pérez MD [facility]        Today's Diagnoses     Chronic diarrhea    -  1    Abdominal pain, generalized          Care Instructions    Good to meet you today.        Schedule upper endoscopy   You are scheduled November 21  Check in time is 12   [facility]  [address] [facility]   You will be mailed the instructions  You will receive a call from the pre assessment nurse       Your symptoms do sound consistent with irritable bowel syndrome and food intolerances but we will work to make sure nothing else is going on    Get labs today  Lab tests today at the 1st floor -- you will be notified of results by letter or my chart message in 7-10 days.  You will receive a phone call if more urgent follow up is needed.      Get stool studies (including 24 hour fecal fat)    Schedule MRI of your small intestine  You are scheduled on September 28  Nothing to eat or drink for 6 hours   Check in time is 11   Check in Room 1-327   [facility]   [address]    Schedule visit with nutritionist today with Naz     Schedule visit with GI psychologist    Please ask your nephrologist if it is ok to go on omeprazole 20 mg daily for 2 months (until a repeat upper endoscopy to look at your esophagus again)    I recommend you take 1 tablespoon of citrucel fiber in a glass of water for 7 days, then increase to 2 tablespoons daily in a glass of water. If able you can even increase to 3 tablespoons daily.    Start a probiotic. Align is a good over the counter option    Keep a detailed food and symptom diary for at least 2 weeks. You can discuss this further with the nutritionist. You can also discuss the low FODMAP diet. I recommend also  avoiding milk products and sodas (both diet and regular)    Follow up in 2 months      Call with questions   Thanks Sonia Banda RN Care Coordinator for Dr. Pérez   Phone   [phone]     For questions regarding your care Monday through Friday, contact the RN GI care coordinator,  Call   [phone] . Your call will be  returned same day, or if consultation is needed with the provider, it may be following business day - or you may send a My Chart message.    For medication refills (prescribed by the GI clinic), contact your pharmacy.    For appointment rescheduling/cancellation, contact [phone]     After hours, or if you have an immediate GI concern and cannot wait for a return call, contact the GI Fellow at [phone] and select option #4.              Follow-ups after your visit        Additional Services     GASTROENTEROLOGY ADULT REF PROCEDURE ONLY [facility] [phone]       Last Lab Result: No results found for: CR  Body mass index is 30.07 kg/(m^2).     Needed:  No  Language:  English    Patient will be contacted to schedule procedure.     Please be aware that coverage of these services is subject to the terms and limitations of your health insurance plan.  Call member services at your health plan with any benefit or coverage questions.  Any procedures must be performed at a [place] facility OR coordinated by your clinic's referral office.    Please bring the following with you to your appointment:    (1) Any X-Rays, CTs or MRIs which have been performed.  Contact the facility where they were done to arrange for  prior to your scheduled appointment.    (2) List of current medications   (3) This referral request   (4) Any documents/labs given to you for this referral                  Your next 10 appointments already scheduled     Sep 28, 2018 12:00 PM CDT   MR ENTEROGRAPHY W CONTRAST with UUMR1   [facility] [place], MRI ([facility]  [place])    [address] [facility] [id number] [phone]           Take your medicines as usual, unless your doctor tells you not to. Bring a list of your current medicines to your exam (including vitamins, minerals and over-the-counter drugs).  You may or may not receive intravenous (IV) contrast for this exam pending the discretion of the Radiologist.  You will be asked to drink oral contrast for this exam. You will be given the oral contrast in MRI prior to your exam. Please arrive 60-90 minutes before your exam time to drink the oral contrast. To prepare:   Do not eat or drink for 6 hours before the exam. If you need to take medicine, you may take it with small sips of water. (We may ask you to take liquid medicine as well.)  The MRI machine uses a strong magnet. Please wear clothes without metal (snaps, zippers). A sweatsuit works well, or we may give you a hospital gown.  Please remove any body piercings and hair extensions before you arrive. You will also remove watches, jewelry, hairpins, wallets, dentures, partial dental plates and hearing aids. You may wear contact lenses, and you may be able to wear your rings. We have a safe place to keep your personal items, but it is safer to leave them at home.  If you have any questions, please contact your Imaging Department exam site.            Nov 21, 2018  1:00 PM CST   Upper Endoscopy with Simon Pérez MD   [facility] ([facility])    [address] [place] [address]   [phone]            Dec 03, 2018  1:00 PM CST   (Arrive by 12:45 PM)   New Patient Visit with Ivanna Mattson, PhD   [facility] ([facility])    [address] [facility]  4th [facility] [id number] [phone]            Dec 03, 2018  2:20 PM CST   (Arrive by 2:05 PM)   Return Visit with Alvin Patel PA-C   [facility] ([facility])    [address]  4th Floor  [facility] [address] [phone]              Future tests that were ordered for you today     Open Future Orders        Priority Expected Expires Ordered    MR Enterography w Contrast Routine  2019    Basic metabolic panel Routine  2019    Hepatitis B core antibody Routine  2019    Clostridium difficile toxin B PCR Routine  2019    Calprotectin Feces Routine  2019    Enteric Bacteria and Virus Panel by FRANKLYN Stool Routine  2019    Fat Stool Qual Random Collection Routine  10/18/2018 2018    Giardia antigen Routine  2019    H Pylori antigen stool Routine  10/18/2018 2018    Ova and Parasite Exam Routine Routine  2019    Pancreatic Elastase Fecal Routine  2019    Fat Stool Quant Timed Collection Routine  2019            Who to contact     Please call your clinic at [phone] to:    Ask questions about your health    Make or cancel appointments    Discuss your medicines    Learn about your test results    Speak to your doctor            Additional Information About Your Visit        [employer] Information     [employer] is an electronic gateway that provides easy, online access to your medical records. With [employer], you can request a clinic appointment, read your test results, renew a prescription or communicate with your care team.     To sign up for Team[employer] visit the website at [email or web address]   You will be asked to enter the access code listed below, as well as some personal information. Please follow the directions to create your username and password.     Your access code is: [email or web address]  Expires: 2018  3:37 PM     Your access code will  in 90 days. If you need help or a new code, please contact your [facility] or  "call [phone] for assistance.        Care EveryWhere ID     This is your Care EveryWhere ID. This could be used by other organizations to access your [place] medical records  [id number]        Your Vitals Were     Pulse Temperature Respirations Height Pulse Oximetry BMI (Body Mass Index)    86 97.9  F (36.6  C) (Oral) 16 1.825 m (5' 11.85\") 98% 30.07 kg/m2       Blood Pressure from Last 3 Encounters:   09/18/18 128/87    Weight from Last 3 Encounters:   09/18/18 100.2 kg (220 lb 12.8 oz)              We Performed the Following     Alpha 1 antitrypsin stool     CBC with platelets differential     CRP inflammation     Erythrocyte sedimentation rate auto     Ferritin     Folate     GASTROENTEROLOGY ADULT REF PROCEDURE ONLY [facility] [phone]     Hepatic panel     Hepatitis A Antibody IgG     Hepatitis B Surface Antibody     Hepatitis B surface antigen     Hepatitis C antibody     IgA     Lipase     Tissue transglutaminase rizwan IgA and IgG     TSH     Vitamin A     Vitamin B12     Vitamin D Deficiency     Vitamin E        Primary Care Provider    None Specified       No primary provider on file.        Equal Access to Services     FOREST GODWIN : Hadii yin ku hadasho Soomaali, waaxda luqadaha, qaybta kaalmada adeegyada, waxay gallo cortes . So [facility] [phone].    ATENCIÓN: Si habla español, tiene a cannon disposición servicios gratuitos de asistencia lingüística. Llame al [phone].    We comply with applicable federal civil rights laws and [place] laws. We do not discriminate on the basis of race, color, national origin, age, disability, sex, sexual orientation, or gender identity.            Thank you!     Thank you for choosing [facility]  for your care. Our goal is always to provide you with excellent care. Hearing back from our patients is one way we can continue to improve our services. Please take a few minutes to complete the written survey " that you may receive in the mail after your visit with us. Thank you!             Your Updated Medication List - Protect others around you: Learn how to safely use, store and throw away your medicines at www.disposemymeds.org.          This list is accurate as of 9/18/18  9:03 AM.  Always use your most recent med list.                   Brand Name Dispense Instructions for use Diagnosis    acetaminophen 325 MG tablet    TYLENOL     Take by mouth as needed        calcium carbonate 500 MG chewable tablet    TUMS     Take by mouth as needed        omeprazole 20 MG CR capsule    priLOSEC

## 2018-09-18 NOTE — PATIENT INSTRUCTIONS
It was nice meeting you today:    -Trial dairy free diet for 3-4 weeks.   -After that 3-4 weeks period, can try adding back in lower lactose dairy products at first such as 1/2 c cottage cheese or 1 oz aged cheese such as swiss or cheddar. Only eat 1 serving of that low lactose dairy food per day for 3-4 days to see if you tolerate or notice any symptoms.  -Keep daily food and beverage journals and track all GI symptoms.  -Decrease carbonated drinks/soda down to no more than one 12 oz drink per day.  -Increase water to drink 48 oz-64 oz (6 oz-8 oz).  -Eat slowly and take 20-30 minutes to eat a meal. Stop eating when satisfied not full.  -Increase fruit and vegetables at meal and aim to have 1-2 servings with each meal.  -Do not skip breakfast meal.  -Can eat 4-6 smaller meals if better tolerated.    At a later time if you do want to follow a low FODMAP diet see below:    Follow a low FODMAP diet. Start with eliminating higher FODMAP foods for 3-4 weeks. Then after 3-4 weeks start adding those higher FODMAP foods back into diet. Start adding 1-2 of your favorite higher FODMAP foods for 3-4 days (eat daily during those 3-4 days). Then add another 1-2 higher FODMAP foods and onward. All along the reintroduction make sure to track GI symptoms daily as well as tracking GI symptoms during the elimination phase.     Or     -For Modified lower FODMAP diet, review list of high FODMAP foods and then eliminate 1-2 of those high FODMAP foods you eat daily/most days to see if any improvement in GI issues. (ie milk).    -Keep daily food journals and track all GI symptoms (diarrhea, constipation, belching, bloating, pain).    If you would like to schedule a follow up appointment with Naz Arriola, Registered Dietitian, please call [phone].

## 2018-09-18 NOTE — LETTER
Patient:  Sathya Quintana  :   1990  MRN:     [id number]        Mr.Brandon ZENAIDA Quintana  [address] [facility] [address]        2018    Dear ,    We are writing to inform you of your test results. Your vitamin A and E levels are normal.    If you have any questions please do not hesitate to call my nurse coordinator, Sonia Banda, at [phone].    Sincerely,  Simon Pérez MD    [facility]  Division of Gastroenterology, Hepatology and Nutrition      Resulted Orders   CBC with platelets differential   Result Value Ref Range    WBC 8.2 4.0 - 11.0 10e9/L    RBC Count 4.74 4.4 - 5.9 10e12/L    Hemoglobin 14.3 13.3 - 17.7 g/dL    Hematocrit 42.5 40.0 - 53.0 %    MCV 90 78 - 100 fl    MCH 30.2 26.5 - 33.0 pg    MCHC 33.6 31.5 - 36.5 g/dL    RDW 12.4 10.0 - 15.0 %    Platelet Count 196 150 - 450 10e9/L    Diff Method Automated Method     % Neutrophils 83.2 %    % Lymphocytes 9.9 %    % Monocytes 4.8 %    % Eosinophils 1.2 %    % Basophils 0.5 %    % Immature Granulocytes 0.4 %    Nucleated RBCs 0 0 /100    Absolute Neutrophil 6.8 1.6 - 8.3 10e9/L    Absolute Lymphocytes 0.8 0.8 - 5.3 10e9/L    Absolute Monocytes 0.4 0.0 - 1.3 10e9/L    Absolute Eosinophils 0.1 0.0 - 0.7 10e9/L    Absolute Basophils 0.0 0.0 - 0.2 10e9/L    Abs Immature Granulocytes 0.0 0 - 0.4 10e9/L    Absolute Nucleated RBC 0.0    Lipase   Result Value Ref Range    Lipase 113 73 - 393 U/L   Erythrocyte sedimentation rate auto   Result Value Ref Range    Sed Rate 8 0 - 15 mm/h   CRP inflammation   Result Value Ref Range    CRP Inflammation 3.3 0.0 - 8.0 mg/L   Ferritin   Result Value Ref Range    Ferritin 95 26 - 388 ng/mL   Hepatic panel   Result Value Ref Range    Bilirubin Direct 0.1 0.0 - 0.2 mg/dL    Bilirubin Total 0.5 0.2 - 1.3 mg/dL    Albumin 3.9 3.4 - 5.0 g/dL    Protein Total 7.8 6.8 - 8.8 g/dL    Alkaline Phosphatase 90 40 - 150 U/L     (H) 0 - 70 U/L      (H) 0 - 45 U/L   Vitamin D Deficiency   Result Value Ref Range    Vitamin D Deficiency screening 28 20 - 75 ug/L      Comment:      Season, race, dietary intake, and treatment affect the concentration of   25-hydroxy-Vitamin D. Values may decrease during winter months and increase   during summer months. Values 20-29 ug/L may indicate Vitamin D insufficiency   and values <20 ug/L may indicate Vitamin D deficiency.  Vitamin D determination is routinely performed by an immunoassay specific for   25 hydroxyvitamin D3.  If an individual is on vitamin D2 (ergocalciferol)   supplementation, please specify 25 OH vitamin D2 and D3 level determination by   LCMSMS test VITD23.     Vitamin B12   Result Value Ref Range    Vitamin B12 1023 (H) 193 - 986 pg/mL   Folate   Result Value Ref Range    Folate 12.6 >5.4 ng/mL   TSH   Result Value Ref Range    TSH 2.18 0.40 - 4.00 mU/L   Tissue transglutaminase rizwan IgA and IgG   Result Value Ref Range    Tissue Transglutaminase Antibody IgA <1 <7 U/mL      Comment:      Negative  The tTG-IgA assay has limited utility for patients with decreased levels of   IgA. Screening for celiac disease should include IgA testing to rule out   selective IgA deficiency and to guide selection and interpretation of   serological testing. tTG-IgG testing may be positive in celiac disease   patients with IgA deficiency.      Tissue Transglutaminase Rizwan IgG <1 <7 U/mL      Comment:      Negative   IgA   Result Value Ref Range     70 - 380 mg/dL   Vitamin A   Result Value Ref Range    Vitamin A 0.72 0.30 - 1.20 mg/L    Retinol Palmitate <0.02 0.00 - 0.10 mg/L    Vitamin A Interp Normal       Comment:      (Note)  Test developed and characteristics determined by [facility]. See Compliance Statement B: [email or web address]  Performed by [facility],  [address] [place] [address] [phone]  [email or web address], Arias Aaron MD, Lab. Director     Vitamin E   Result Value Ref Range     Vitamin E 6.9 5.5 - 18.0 mg/L      Comment:      (Note)  Test developed and characteristics determined by [facility]. See Compliance Statement B: [facility]/      Vitamin E Gamma 2.0 0.0 - 6.0 mg/L      Comment:      (Note)  Performed by [facility],  [address] [place] [id number] [phone]  [email or web address], Arias Aaron MD, Lab. Director     Hepatitis A Antibody IgG   Result Value Ref Range    Hepatitis A Antibody IgG Nonreactive NR^Nonreactive      Comment:      This assay cannot be used for the diagnosis of acute HAV infection.   Hepatitis B Surface Antibody   Result Value Ref Range    Hepatitis B Surface Antibody 20.45 (H) <8.00 m[IU]/mL      Comment:      Reactive, Patient is considered to be immune to infection with hepatitis B   when the value is greater than or equal to 12.0 m[IU]/mL.     Hepatitis B surface antigen   Result Value Ref Range    Hep B Surface Agn Nonreactive NR^Nonreactive   Hepatitis C antibody   Result Value Ref Range    Hepatitis C Antibody Nonreactive NR^Nonreactive      Comment:      Assay performance characteristics have not been established for newborns,   infants, and children

## 2018-09-19 LAB
TTG IGA SER-ACNC: <1 U/ML
TTG IGG SER-ACNC: <1 U/ML

## 2018-09-20 LAB
A-TOCOPHEROL VIT E SERPL-MCNC: 6.9 MG/L (ref 5.5–18)
ANNOTATION COMMENT IMP: NORMAL
BETA+GAMMA TOCOPHEROL SERPL-MCNC: 2 MG/L (ref 0–6)
RETINYL PALMITATE SERPL-MCNC: <0.02 MG/L (ref 0–0.1)
VIT A SERPL-MCNC: 0.72 MG/L (ref 0.3–1.2)

## 2018-09-20 ASSESSMENT — PATIENT HEALTH QUESTIONNAIRE - PHQ9: SUM OF ALL RESPONSES TO PHQ QUESTIONS 1-9: 13

## 2018-09-26 ENCOUNTER — CARE COORDINATION (OUTPATIENT)
Dept: GASTROENTEROLOGY | Facility: CLINIC | Age: 28
End: 2018-09-26

## 2018-09-26 DIAGNOSIS — K52.9 CHRONIC DIARRHEA: ICD-10-CM

## 2018-09-26 PROCEDURE — 87329 GIARDIA AG IA: CPT | Performed by: INTERNAL MEDICINE

## 2018-09-26 PROCEDURE — 87506 IADNA-DNA/RNA PROBE TQ 6-11: CPT | Performed by: INTERNAL MEDICINE

## 2018-09-26 PROCEDURE — 87338 HPYLORI STOOL AG IA: CPT | Performed by: INTERNAL MEDICINE

## 2018-09-26 PROCEDURE — 87493 C DIFF AMPLIFIED PROBE: CPT | Performed by: INTERNAL MEDICINE

## 2018-09-26 PROCEDURE — 87209 SMEAR COMPLEX STAIN: CPT | Performed by: INTERNAL MEDICINE

## 2018-09-26 PROCEDURE — 87177 OVA AND PARASITES SMEARS: CPT | Performed by: INTERNAL MEDICINE

## 2018-09-26 NOTE — PROGRESS NOTES
Called pt to discuss lab reults and the need for an ultrasound. Pt now scheduled on ultrasound on Friday Septemer 28 at 10 am with check in at 945.  Nothing to eat or drink for 8 hours. Pt will drop off stool studies and complete additional labs.          His LFTs are abnormal - more than previously.     When he is in town he needs an US of his liver and some additional blood work (orders placed)

## 2018-09-27 ENCOUNTER — HOSPITAL ENCOUNTER (OUTPATIENT)
Facility: CLINIC | Age: 28
Setting detail: SPECIMEN
Discharge: HOME | End: 2018-09-27
Admitting: INTERNAL MEDICINE
Payer: COMMERCIAL

## 2018-09-27 PROCEDURE — 82103 ALPHA-1-ANTITRYPSIN TOTAL: CPT | Performed by: INTERNAL MEDICINE

## 2018-09-27 PROCEDURE — 82710 FATS/LIPIDS FECES QUANT: CPT | Performed by: INTERNAL MEDICINE

## 2018-09-27 PROCEDURE — 83520 IMMUNOASSAY QUANT NOS NONAB: CPT | Performed by: INTERNAL MEDICINE

## 2018-09-27 PROCEDURE — 82705 FATS/LIPIDS FECES QUAL: CPT | Performed by: INTERNAL MEDICINE

## 2018-09-27 PROCEDURE — 83993 ASSAY FOR CALPROTECTIN FECAL: CPT | Performed by: INTERNAL MEDICINE

## 2018-09-28 ENCOUNTER — HOSPITAL ENCOUNTER (OUTPATIENT)
Dept: MRI IMAGING | Facility: CLINIC | Age: 28
Discharge: HOME | End: 2018-09-28
Attending: INTERNAL MEDICINE | Admitting: INTERNAL MEDICINE
Payer: COMMERCIAL

## 2018-09-28 ENCOUNTER — HOSPITAL ENCOUNTER (OUTPATIENT)
Dept: ULTRASOUND IMAGING | Facility: CLINIC | Age: 28
End: 2018-09-28
Attending: INTERNAL MEDICINE
Payer: COMMERCIAL

## 2018-09-28 DIAGNOSIS — R79.89 ABNORMAL LIVER FUNCTION TESTS: ICD-10-CM

## 2018-09-28 DIAGNOSIS — K52.9 CHRONIC DIARRHEA: ICD-10-CM

## 2018-09-28 LAB
C COLI+JEJUNI+LARI FUSA STL QL NAA+PROBE: NOT DETECTED
C DIFF TOX B STL QL: NEGATIVE
EC STX1 GENE STL QL NAA+PROBE: NOT DETECTED
EC STX2 GENE STL QL NAA+PROBE: NOT DETECTED
ENTERIC PATHOGEN COMMENT: NORMAL
NOROV GI+II ORF1-ORF2 JNC STL QL NAA+PR: NOT DETECTED
RVA NSP5 STL QL NAA+PROBE: NOT DETECTED
SALMONELLA SP RPOD STL QL NAA+PROBE: NOT DETECTED
SHIGELLA SP+EIEC IPAH STL QL NAA+PROBE: NOT DETECTED
SPECIMEN SOURCE: NORMAL
V CHOL+PARA RFBL+TRKH+TNAA STL QL NAA+PR: NOT DETECTED
Y ENTERO RECN STL QL NAA+PROBE: NOT DETECTED

## 2018-09-28 PROCEDURE — A9585 GADOBUTROL INJECTION: HCPCS | Performed by: RADIOLOGY

## 2018-09-28 PROCEDURE — 25500064 ZZH RX 255 OP 636: Performed by: RADIOLOGY

## 2018-09-28 PROCEDURE — 25000128 H RX IP 250 OP 636: Performed by: RADIOLOGY

## 2018-09-28 PROCEDURE — 93975 VASCULAR STUDY: CPT | Mod: TC

## 2018-09-28 PROCEDURE — 72197 MRI PELVIS W/O & W/DYE: CPT

## 2018-09-28 RX ORDER — GADOBUTROL 604.72 MG/ML
10 INJECTION INTRAVENOUS ONCE
Status: COMPLETED | OUTPATIENT
Start: 2018-09-28 | End: 2018-09-28

## 2018-09-28 RX ADMIN — GADOBUTROL 10 ML: 604.72 INJECTION INTRAVENOUS at 12:47

## 2018-09-28 RX ADMIN — GLUCAGON HYDROCHLORIDE 1 MG: KIT at 12:31

## 2018-09-29 LAB
A1AT STL-MCNT: 0.23 MG/G (ref 0–0.5)
FAT 24H STL-MRATE: NORMAL G/(24.H)
FAT STL QL: NORMAL
NEUTRAL FAT STL QL: NORMAL

## 2018-09-30 LAB
ELASTASE PANC STL-MCNT: >500 UG/G
G LAMBLIA AG STL QL IA: NORMAL
SPECIMEN SOURCE: NORMAL

## 2018-10-01 LAB
H PYLORI AG STL QL IA: NORMAL
O+P STL MICRO: NORMAL
O+P STL MICRO: NORMAL
SPECIMEN SOURCE: NORMAL
SPECIMEN SOURCE: NORMAL

## 2018-10-02 LAB — CALPROTECTIN STL-MCNT: <27 MG/KG (ref 0–49.9)

## 2018-10-11 ENCOUNTER — CARE COORDINATION (OUTPATIENT)
Dept: GASTROENTEROLOGY | Facility: CLINIC | Age: 28
End: 2018-10-11

## 2018-10-11 NOTE — PROGRESS NOTES
Called pt to discuss where to send orders for labs that Dr. Pérez is requesting. Pt will have drawn next week.  Faxed lab orders to [facility] at .

## 2018-10-12 ENCOUNTER — CARE COORDINATION (OUTPATIENT)
Dept: GASTROENTEROLOGY | Facility: CLINIC | Age: 28
End: 2018-10-12

## 2018-10-12 DIAGNOSIS — R79.89 ELEVATED LFTS: Primary | ICD-10-CM

## 2018-10-12 DIAGNOSIS — K76.0 FATTY LIVER: ICD-10-CM

## 2018-10-15 ENCOUNTER — TRANSFERRED RECORDS (OUTPATIENT)
Dept: HEALTH INFORMATION MANAGEMENT | Facility: CLINIC | Age: 28
End: 2018-10-15

## 2018-10-16 ENCOUNTER — TELEPHONE (OUTPATIENT)
Dept: GASTROENTEROLOGY | Facility: CLINIC | Age: 28
End: 2018-10-16

## 2018-10-16 NOTE — TELEPHONE ENCOUNTER
Spoke to patient in regards to scheduling referral to Hepatology clinic. Patient was working and stated that he will call himself to schedule the appointment in Hepatology. Patient was given number to Hepatology clinic as well as my call back number in case there were any further questions.

## 2018-10-19 ENCOUNTER — TELEPHONE (OUTPATIENT)
Dept: GASTROENTEROLOGY | Facility: CLINIC | Age: 28
End: 2018-10-19

## 2018-10-19 NOTE — TELEPHONE ENCOUNTER
"  [facility] Call Center    Phone Message    May a detailed message be left on voicemail: yes    Reason for Call: Other: pt called regarding omeprazole (PRILOSEC) 20 MG CR capsule, pt states he received \"bad kidney numbers'\" and would like to know if he should continue taking medication.  PT states he was told to take medication until his endoscopy. Please f/u with PT.    Action Taken: Message routed to:  [facility] GI      "

## 2018-10-23 ENCOUNTER — OFFICE VISIT (OUTPATIENT)
Dept: GASTROENTEROLOGY | Facility: CLINIC | Age: 28
End: 2018-10-23
Attending: INTERNAL MEDICINE
Payer: COMMERCIAL

## 2018-10-23 VITALS
BODY MASS INDEX: 28.99 KG/M2 | SYSTOLIC BLOOD PRESSURE: 125 MMHG | HEIGHT: 72 IN | HEART RATE: 54 BPM | TEMPERATURE: 97.9 F | WEIGHT: 214 LBS | OXYGEN SATURATION: 100 % | DIASTOLIC BLOOD PRESSURE: 67 MMHG

## 2018-10-23 DIAGNOSIS — R79.89 ABNORMAL LIVER FUNCTION TESTS: ICD-10-CM

## 2018-10-23 DIAGNOSIS — R79.89 ABNORMAL LIVER FUNCTION TESTS: Primary | ICD-10-CM

## 2018-10-23 LAB
ALBUMIN SERPL-MCNC: 4 G/DL (ref 3.4–5)
ALP SERPL-CCNC: 86 U/L (ref 40–150)
ALT SERPL W P-5'-P-CCNC: 45 U/L (ref 0–70)
ANION GAP SERPL CALCULATED.3IONS-SCNC: 7 MMOL/L (ref 3–14)
AST SERPL W P-5'-P-CCNC: 19 U/L (ref 0–45)
BILIRUB DIRECT SERPL-MCNC: 0.1 MG/DL (ref 0–0.2)
BILIRUB SERPL-MCNC: 0.5 MG/DL (ref 0.2–1.3)
BUN SERPL-MCNC: 20 MG/DL (ref 7–30)
CALCIUM SERPL-MCNC: 8.9 MG/DL (ref 8.5–10.1)
CHLORIDE SERPL-SCNC: 107 MMOL/L (ref 94–109)
CO2 SERPL-SCNC: 25 MMOL/L (ref 20–32)
CREAT SERPL-MCNC: 1.29 MG/DL (ref 0.66–1.25)
ERYTHROCYTE [DISTWIDTH] IN BLOOD BY AUTOMATED COUNT: 12.5 % (ref 10–15)
FERRITIN SERPL-MCNC: 49 NG/ML (ref 26–388)
GFR SERPL CREATININE-BSD FRML MDRD: 65 ML/MIN/1.7M2
GLUCOSE SERPL-MCNC: 125 MG/DL (ref 70–99)
HCT VFR BLD AUTO: 42.3 % (ref 40–53)
HGB BLD-MCNC: 14.4 G/DL (ref 13.3–17.7)
IGG SERPL-MCNC: 1030 MG/DL (ref 695–1620)
INR PPP: 1.04 (ref 0.86–1.14)
IRON SATN MFR SERPL: 28 % (ref 15–46)
IRON SERPL-MCNC: 96 UG/DL (ref 35–180)
MCH RBC QN AUTO: 30.6 PG (ref 26.5–33)
MCHC RBC AUTO-ENTMCNC: 34 G/DL (ref 31.5–36.5)
MCV RBC AUTO: 90 FL (ref 78–100)
PLATELET # BLD AUTO: 217 10E9/L (ref 150–450)
POTASSIUM SERPL-SCNC: 4.5 MMOL/L (ref 3.4–5.3)
PROT SERPL-MCNC: 7.8 G/DL (ref 6.8–8.8)
RBC # BLD AUTO: 4.71 10E12/L (ref 4.4–5.9)
SODIUM SERPL-SCNC: 139 MMOL/L (ref 133–144)
TIBC SERPL-MCNC: 342 UG/DL (ref 240–430)
WBC # BLD AUTO: 6 10E9/L (ref 4–11)

## 2018-10-23 PROCEDURE — 84165 PROTEIN E-PHORESIS SERUM: CPT | Performed by: INTERNAL MEDICINE

## 2018-10-23 PROCEDURE — 83540 ASSAY OF IRON: CPT | Performed by: INTERNAL MEDICINE

## 2018-10-23 PROCEDURE — 82103 ALPHA-1-ANTITRYPSIN TOTAL: CPT | Performed by: INTERNAL MEDICINE

## 2018-10-23 PROCEDURE — 80076 HEPATIC FUNCTION PANEL: CPT | Performed by: INTERNAL MEDICINE

## 2018-10-23 PROCEDURE — 81332 SERPINA1 GENE: CPT | Performed by: INTERNAL MEDICINE

## 2018-10-23 PROCEDURE — 85610 PROTHROMBIN TIME: CPT | Performed by: INTERNAL MEDICINE

## 2018-10-23 PROCEDURE — G0463 HOSPITAL OUTPT CLINIC VISIT: HCPCS | Mod: ZF

## 2018-10-23 PROCEDURE — 82390 ASSAY OF CERULOPLASMIN: CPT | Performed by: INTERNAL MEDICINE

## 2018-10-23 PROCEDURE — 00000402 ZZHCL STATISTIC TOTAL PROTEIN: Performed by: INTERNAL MEDICINE

## 2018-10-23 PROCEDURE — 82728 ASSAY OF FERRITIN: CPT | Performed by: INTERNAL MEDICINE

## 2018-10-23 PROCEDURE — 82784 ASSAY IGA/IGD/IGG/IGM EACH: CPT | Performed by: INTERNAL MEDICINE

## 2018-10-23 PROCEDURE — 36415 COLL VENOUS BLD VENIPUNCTURE: CPT | Performed by: INTERNAL MEDICINE

## 2018-10-23 PROCEDURE — 83550 IRON BINDING TEST: CPT | Performed by: INTERNAL MEDICINE

## 2018-10-23 PROCEDURE — 86038 ANTINUCLEAR ANTIBODIES: CPT | Performed by: INTERNAL MEDICINE

## 2018-10-23 PROCEDURE — 83516 IMMUNOASSAY NONANTIBODY: CPT | Performed by: INTERNAL MEDICINE

## 2018-10-23 PROCEDURE — 85027 COMPLETE CBC AUTOMATED: CPT | Performed by: INTERNAL MEDICINE

## 2018-10-23 PROCEDURE — 80048 BASIC METABOLIC PNL TOTAL CA: CPT | Performed by: INTERNAL MEDICINE

## 2018-10-23 ASSESSMENT — PAIN SCALES - GENERAL: PAINLEVEL: NO PAIN (0)

## 2018-10-23 NOTE — MR AVS SNAPSHOT
After Visit Summary   10/23/2018    Sathya Quintana    MRN: [id number]           Patient Information     Date Of Birth          1990        Visit Information        Provider Department      10/23/2018 8:00 AM Iván Shelley MD [facility] Hepatology        Today's Diagnoses     Abnormal liver function tests    -  1       Follow-ups after your visit        Follow-up notes from your care team     Return in about 6 months (around 4/23/2019).      Your next 10 appointments already scheduled     Nov 21, 2018  1:00 PM CST   Upper Endoscopy with Simon Pérez MD   [facility] ([facility])    [address]  [address]  [place] [address]   [phone]            Dec 03, 2018  1:00 PM CST   (Arrive by 12:45 PM)   New Patient Visit with Ivanna Mattson, PhD   [facility] ([facility])    [address] [place] [address]  [address]th [facility] [address]   [phone]            Dec 03, 2018  2:20 PM CST   (Arrive by 2:05 PM)   Return Visit with Alvin Patel PA-C   [facility] ([facility])    [address] [place] [address]  4th Floor  [facility] [address]   [phone]            Apr 02, 2019 10:00 AM CDT   Lab with  LAB   [facility] Lab ([facility])    [address] [place] [address]  1st [facility] [address]   [phone]            Apr 02, 2019 11:00 AM CDT   (Arrive by 10:45 AM)   Return General Liver with Iván Floyd MD   [facility] Hepatology ([facility])    [address] [place] [address]  [address]  [facility] [address]   [phone]              Future tests that were ordered for you today     Open Future Orders        Priority Expected Expires Ordered    IgG Routine  11/22/2018 10/23/2018    INR Routine  11/22/2018 10/23/2018    IRON Routine  11/22/2018 10/23/2018    IRON AND IRON  "BINDING CAPACITY Routine  2018 10/23/2018    FERRITIN Routine  2018 10/23/2018    Alpha 1 antitryp tali reflex to pheno Routine  2018 10/23/2018    Basic metabolic panel Routine  2018 10/23/2018    CBC with platelets Routine  2018 10/23/2018    Hepatic panel Routine  2018 10/23/2018            Who to contact     If you have questions or need follow up information about today's clinic visit or your schedule please contact [facility] HEPATOLOGY directly at [phone].  Normal or non-critical lab and imaging results will be communicated to you by ParentsWarehart, letter or phone within 4 business days after the clinic has received the results. If you do not hear from us within 7 days, please contact the clinic through ShowEvidencet or phone. If you have a critical or abnormal lab result, we will notify you by phone as soon as possible.  Submit refill requests through CyActive or call your pharmacy and they will forward the refill request to us. Please allow 3 business days for your refill to be completed.          Additional Information About Your Visit        MyChart Information     CyActive lets you send messages to your doctor, view your test results, renew your prescriptions, schedule appointments and more. To sign up, go to [email or web address] . Click on \"Log in\" on the left side of the screen, which will take you to the Welcome page. Then click on \"Sign up Now\" on the right side of the page.     You will be asked to enter the access code listed below, as well as some personal information. Please follow the directions to create your username and password.     Your access code is: [email or web address]  Expires: 2018  3:37 PM     Your access code will  in 90 days. If you need help or a new code, please call your [place] clinic or [phone].        Care EveryWhere ID     This is your Care EveryWhere ID. This could be used by other organizations to access your [place] medical " records  [id number]         Blood Pressure from Last 3 Encounters:   09/18/18 128/87    Weight from Last 3 Encounters:   09/18/18 100.2 kg (220 lb 12.8 oz)              We Performed the Following     Anti Nuclear Columba IgG by IFA with Reflex     F Actin EIA with reflex        Primary Care Provider Office Phone # Fax #    Linwood Pina [phone]       [place] [address]        Equal Access to Services     [facility] : Hadii aad ku hadasho Soomaali, waaxda luqadaha, qaybta kaalmada adeegyada, waxay idiin hayaan adeeg kharash laleland . So [facility] [phone].    ATENCIÓN: Si habla español, tiene a cannon disposición servicios gratuitos de asistencia lingüística. [facility] [phone].    We comply with applicable federal civil rights laws and [place] laws. We do not discriminate on the basis of race, color, national origin, age, disability, sex, sexual orientation, or gender identity.            Thank you!     Thank you for choosing [facility] HEPATOLOGY  for your care. Our goal is always to provide you with excellent care. Hearing back from our patients is one way we can continue to improve our services. Please take a few minutes to complete the written survey that you may receive in the mail after your visit with us. Thank you!             Your Updated Medication List - Protect others around you: Learn how to safely use, store and throw away your medicines at www.disposemymeds.org.          This list is accurate as of 10/23/18  8:41 AM.  Always use your most recent med list.                   Brand Name Dispense Instructions for use Diagnosis    acetaminophen 325 MG tablet    TYLENOL     Take by mouth as needed        calcium carbonate 500 MG chewable tablet    TUMS     Take by mouth as needed

## 2018-10-23 NOTE — TELEPHONE ENCOUNTER
Called pt to discuss his omeprazole. Pt said that it took a long time for his nephrologist at [place] to get back to him.  Pt stated staff from his nephrologist called and stated absolutely not to take. Pt was to take for 2 months then have egd.  Pt is now scheduled for November 21 for egd. Pt said he was not given a reason why. Pt did his hepatology today and said that Dr. Faria was not sure why nephrologist would not want pt to take omeprazole.   Will route to Dr. Pérez for further recommendations.

## 2018-10-23 NOTE — LETTER
10/23/2018      RE: Sathya Quintana  [address] [place] [address]       [facility]    Hepatology New Patient Visit    Referring provider:  Simon Pérez      28 year old male    Chief complaint:  Abnormal liver function tests    HPI:  The patient comes to clinic this morning with his sister for further evaluation and management of abnormal liver function tests.  Abnormal liver function tests were first identified in late 2018 on testing to evaluate loose stools.  The patient was seen in the [facility] for likely irritable bowel syndrome.  Liver function tests were checked at that time and were more elevated.  Abdominal ultrasound is consistent with increased echogenicity.  Testing for celiac disease and viral hepatitis returned negative.      The patient is feeling well today.  He has cut down on the amount of Benedicto Yello that he was drinking at home.  He did see the dietitian regarding a FODMAP diet but did the last 36 hours on the dairy-free aspect.  He denies any symptoms specific to liver disease.      The patient denies jaundice, abdominal distention, lower extremity edema, lethargy or confusion.      No history of melena, hematemesis or hematochezia.      The patient denies any fevers, sweats or chills.      The patient reports that he has gained 20-30 pounds over the last 6-7 years.      No history of CAD, CVA, hypertension, hyperlipidemia, diabetes, obstructive sleep apnea or peripheral vascular disease.      Family history of type 2 diabetes in father noted.        New medications over the last 6-12 months include clarithromycin, metronidazole and omeprazole for treatment of H. pylori infection.  Other medications include Bentyl and methylprednisolone.  The patient denies any herbal medications or supplements.      The patient drinks alcohol once per week.  On these occasions, he will drink on average 2-3 drinks.  He denies any history of alcohol  "abuse or DUIs.      The patient has never smoked.  He denies any recreational drug use, including marijuana, IV or IN drugs.       Medical hx Surgical hx   Past Medical History:   Diagnosis Date     Irritable bowel syndrome      Overweight       Past Surgical History:   Procedure Laterality Date     TONSILLECTOMY            Medications  Prior to Admission medications    Medication Sig Start Date End Date Taking? Authorizing Provider   acetaminophen (TYLENOL) 325 MG tablet Take by mouth as needed   Yes Reported, Patient   calcium carbonate (TUMS) 500 MG chewable tablet Take by mouth as needed   Yes Reported, Patient       Allergies  Allergies   Allergen Reactions     Penicillin G Unknown     Allergy when he was very young does not know reaction       Family hx Social hx   Family History   Problem Relation Age of Onset     Type 2 Diabetes Father      Kidney Cancer Sister      Breast Cancer Maternal Grandmother      Coronary Artery Disease Early Onset Maternal Grandfather      Liver Disease No family hx of      Colon Cancer No family hx of       Social History   Substance Use Topics     Smoking status: Former Smoker     Smokeless tobacco: Former User     Alcohol use 1.2 - 3.6 oz/week     1 - 3 Cans of beer, 1 - 3 Shots of liquor per week      Comment: socially     Lives in [place], [place] with wife and healthy 8 year old son.  Works as farmer (soy and corn).       Review of systems  A 10-point review of systems was negative.    Examination  /67  Pulse 54  Temp 97.9  F (36.6  C) (Oral)  Ht 1.825 m (5' 11.85\")  Wt 97.1 kg (214 lb)  SpO2 100%  BMI 29.14 kg/m2  Body mass index is 29.14 kg/(m^2).    Gen- well, NAD, A+Ox3, normal color  Eye- EOMI  ENT- MMM, normal oropharynx  Lym- no palpable lymphadenopathy  CVS- S1, S2 normal, no added sounds, RRR  RS- CTA  Abd- overweight, soft, non-tender, no ascites or organomegaly on palpation or percussion, BS+  Extr- pulses good, no ARAMIS  MS- hands normal- no clubbing  Neuro- " A+Ox3, no asterixis  Skin- no rash or jaundice  Psych- normal mood    Laboratory  Lab Results   Component Value Date     09/18/2018    POTASSIUM 4.4 09/18/2018    CHLORIDE 106 09/18/2018    CO2 26 09/18/2018    BUN 18 09/18/2018    CR 1.26 09/18/2018       Lab Results   Component Value Date    BILITOTAL 0.5 09/18/2018     09/18/2018     09/18/2018    ALKPHOS 90 09/18/2018       Lab Results   Component Value Date    ALBUMIN 3.9 09/18/2018    PROTTOTAL 7.8 09/18/2018        Lab Results   Component Value Date    WBC 8.2 09/18/2018    HGB 14.3 09/18/2018    MCV 90 09/18/2018     09/18/2018 9/18/18  HAV ab neg  HBV SAg neg  HBV CAb neg  HCV Ab neg    TTG Ab neg    Radiology  Abd U/S Sep 2018 reviewed  MR enterography Sep 2018 reviewed    Assessment  28 year old male who presents for further evaluation and management of abnormal liver function tests most likely secondary to non-alcoholic fatty liver disease.  No clinical, biochemical or radiographic evidence of cirrhosis.  Will rule out other etiologies of chronic liver disease including hemochromatosis, autoimmune hepatitis and alpha-1 anti-trypsin deficiency.      We discussed the pathophysiology, natural history and management of NAFLD which is primarily weight loss with diet and exercise.      Plan  1.  Check CMP, INR, CBC  2.  Check iron studies, A1-AT phenotype, IgG, SAM, F-actin Ab  3.  Weight loss with diet and exercise  4.  Follow-up in 6 months    Iván Faria MD  Hepatology  [facility]

## 2018-10-23 NOTE — NURSING NOTE
Chief Complaint   Patient presents with     Consult     Elevated Liver Tests/Fatty Liver   Pt roomed, vitals, meds, and allergies reviewed with pt. Pt ready for provider.  Jackson Armstrong, CMA

## 2018-10-23 NOTE — PROGRESS NOTES
[facility]    Hepatology New Patient Visit    Referring provider:  Simon Pérez      28 year old male    Chief complaint:  Abnormal liver function tests    HPI:  The patient comes to clinic this morning with his sister for further evaluation and management of abnormal liver function tests.  Abnormal liver function tests were first identified in late 2018 on testing to evaluate loose stools.  The patient was seen in the [facility] for likely irritable bowel syndrome.  Liver function tests were checked at that time and were more elevated.  Abdominal ultrasound is consistent with increased echogenicity.  Testing for celiac disease and viral hepatitis returned negative.      The patient is feeling well today.  He has cut down on the amount of Benedicto Yello that he was drinking at home.  He did see the dietitian regarding a FODMAP diet but did the last 36 hours on the dairy-free aspect.  He denies any symptoms specific to liver disease.      The patient denies jaundice, abdominal distention, lower extremity edema, lethargy or confusion.      No history of melena, hematemesis or hematochezia.      The patient denies any fevers, sweats or chills.      The patient reports that he has gained 20-30 pounds over the last 6-7 years.      No history of CAD, CVA, hypertension, hyperlipidemia, diabetes, obstructive sleep apnea or peripheral vascular disease.      Family history of type 2 diabetes in father noted.        New medications over the last 6-12 months include clarithromycin, metronidazole and omeprazole for treatment of H. pylori infection.  Other medications include Bentyl and methylprednisolone.  The patient denies any herbal medications or supplements.      The patient drinks alcohol once per week.  On these occasions, he will drink on average 2-3 drinks.  He denies any history of alcohol abuse or DUIs.      The patient has never smoked.  He denies any recreational drug use,  "including marijuana, IV or IN drugs.       Medical hx Surgical hx   Past Medical History:   Diagnosis Date     Irritable bowel syndrome      Overweight       Past Surgical History:   Procedure Laterality Date     TONSILLECTOMY            Medications  Prior to Admission medications    Medication Sig Start Date End Date Taking? Authorizing Provider   acetaminophen (TYLENOL) 325 MG tablet Take by mouth as needed   Yes Reported, Patient   calcium carbonate (TUMS) 500 MG chewable tablet Take by mouth as needed   Yes Reported, Patient       Allergies  Allergies   Allergen Reactions     Penicillin G Unknown     Allergy when he was very young does not know reaction       Family hx Social hx   Family History   Problem Relation Age of Onset     Type 2 Diabetes Father      Kidney Cancer Sister      Breast Cancer Maternal Grandmother      Coronary Artery Disease Early Onset Maternal Grandfather      Liver Disease No family hx of      Colon Cancer No family hx of       Social History   Substance Use Topics     Smoking status: Former Smoker     Smokeless tobacco: Former User     Alcohol use 1.2 - 3.6 oz/week     1 - 3 Cans of beer, 1 - 3 Shots of liquor per week      Comment: socially     Lives in [place], [place] with wife and healthy 8 year old son.  Works as farmer (soy and corn).       Review of systems  A 10-point review of systems was negative.    Examination  /67  Pulse 54  Temp 97.9  F (36.6  C) (Oral)  Ht 1.825 m (5' 11.85\")  Wt 97.1 kg (214 lb)  SpO2 100%  BMI 29.14 kg/m2  Body mass index is 29.14 kg/(m^2).    Gen- well, NAD, A+Ox3, normal color  Eye- EOMI  ENT- MMM, normal oropharynx  Lym- no palpable lymphadenopathy  CVS- S1, S2 normal, no added sounds, RRR  RS- CTA  Abd- overweight, soft, non-tender, no ascites or organomegaly on palpation or percussion, BS+  Extr- pulses good, no ARAMSI  MS- hands normal- no clubbing  Neuro- A+Ox3, no asterixis  Skin- no rash or jaundice  Psych- normal mood    Laboratory  Lab " Results   Component Value Date     09/18/2018    POTASSIUM 4.4 09/18/2018    CHLORIDE 106 09/18/2018    CO2 26 09/18/2018    BUN 18 09/18/2018    CR 1.26 09/18/2018       Lab Results   Component Value Date    BILITOTAL 0.5 09/18/2018     09/18/2018     09/18/2018    ALKPHOS 90 09/18/2018       Lab Results   Component Value Date    ALBUMIN 3.9 09/18/2018    PROTTOTAL 7.8 09/18/2018        Lab Results   Component Value Date    WBC 8.2 09/18/2018    HGB 14.3 09/18/2018    MCV 90 09/18/2018     09/18/2018 9/18/18  HAV ab neg  HBV SAg neg  HBV CAb neg  HCV Ab neg    TTG Ab neg    Radiology  Abd U/S Sep 2018 reviewed  MR enterography Sep 2018 reviewed    Assessment  28 year old male who presents for further evaluation and management of abnormal liver function tests most likely secondary to non-alcoholic fatty liver disease.  No clinical, biochemical or radiographic evidence of cirrhosis.  Will rule out other etiologies of chronic liver disease including hemochromatosis, autoimmune hepatitis and alpha-1 anti-trypsin deficiency.      We discussed the pathophysiology, natural history and management of NAFLD which is primarily weight loss with diet and exercise.      Plan  1.  Check CMP, INR, CBC  2.  Check iron studies, A1-AT phenotype, IgG, SAM, F-actin Ab  3.  Weight loss with diet and exercise  4.  Follow-up in 6 months    Iván Faria MD  Hepatology  [facility]

## 2018-10-23 NOTE — LETTER
10/23/2018       RE: Sathya Quintana  [address] [place] [address]     Dear Colleague,    Thank you for referring your patient, Sathya Quintana, to the [facility] HEPATOLOGY at [facility]. Please see a copy of my visit note below.    [facility]    Hepatology New Patient Visit    Referring provider:  Simon Pérez      28 year old male    Chief complaint:  Abnormal liver function tests    HPI:  The patient comes to clinic this morning with his sister for further evaluation and management of abnormal liver function tests.  Abnormal liver function tests were first identified in late 2018 on testing to evaluate loose stools.  The patient was seen in the [facility] for likely irritable bowel syndrome.  Liver function tests were checked at that time and were more elevated.  Abdominal ultrasound is consistent with increased echogenicity.  Testing for celiac disease and viral hepatitis returned negative.      The patient is feeling well today.  He has cut down on the amount of Benedicto Yello that he was drinking at home.  He did see the dietitian regarding a FODMAP diet but did the last 36 hours on the dairy-free aspect.  He denies any symptoms specific to liver disease.      The patient denies jaundice, abdominal distention, lower extremity edema, lethargy or confusion.      No history of melena, hematemesis or hematochezia.      The patient denies any fevers, sweats or chills.      The patient reports that he has gained 20-30 pounds over the last 6-7 years.      No history of CAD, CVA, hypertension, hyperlipidemia, diabetes, obstructive sleep apnea or peripheral vascular disease.      Family history of type 2 diabetes in father noted.        New medications over the last 6-12 months include clarithromycin, metronidazole and omeprazole for treatment of H. pylori infection.  Other medications include Bentyl and methylprednisolone.   "The patient denies any herbal medications or supplements.      The patient drinks alcohol once per week.  On these occasions, he will drink on average 2-3 drinks.  He denies any history of alcohol abuse or DUIs.      The patient has never smoked.  He denies any recreational drug use, including marijuana, IV or IN drugs.       Medical hx Surgical hx   Past Medical History:   Diagnosis Date     Irritable bowel syndrome      Overweight       Past Surgical History:   Procedure Laterality Date     TONSILLECTOMY            Medications  Prior to Admission medications    Medication Sig Start Date End Date Taking? Authorizing Provider   acetaminophen (TYLENOL) 325 MG tablet Take by mouth as needed   Yes Reported, Patient   calcium carbonate (TUMS) 500 MG chewable tablet Take by mouth as needed   Yes Reported, Patient       Allergies  Allergies   Allergen Reactions     Penicillin G Unknown     Allergy when he was very young does not know reaction       Family hx Social hx   Family History   Problem Relation Age of Onset     Type 2 Diabetes Father      Kidney Cancer Sister      Breast Cancer Maternal Grandmother      Coronary Artery Disease Early Onset Maternal Grandfather      Liver Disease No family hx of      Colon Cancer No family hx of       Social History   Substance Use Topics     Smoking status: Former Smoker     Smokeless tobacco: Former User     Alcohol use 1.2 - 3.6 oz/week     1 - 3 Cans of beer, 1 - 3 Shots of liquor per week      Comment: socially     Lives in [place], [place] with wife and healthy 8 year old son.  Works as farmer (soy and corn).       Review of systems  A 10-point review of systems was negative.    Examination  /67  Pulse 54  Temp 97.9  F (36.6  C) (Oral)  Ht 1.825 m (5' 11.85\")  Wt 97.1 kg (214 lb)  SpO2 100%  BMI 29.14 kg/m2  Body mass index is 29.14 kg/(m^2).    Gen- well, NAD, A+Ox3, normal color  Eye- EOMI  ENT- MMM, normal oropharynx  Lym- no palpable lymphadenopathy  CVS- S1, S2 " normal, no added sounds, RRR  RS- CTA  Abd- overweight, soft, non-tender, no ascites or organomegaly on palpation or percussion, BS+  Extr- pulses good, no ARAMIS  MS- hands normal- no clubbing  Neuro- A+Ox3, no asterixis  Skin- no rash or jaundice  Psych- normal mood    Laboratory  Lab Results   Component Value Date     09/18/2018    POTASSIUM 4.4 09/18/2018    CHLORIDE 106 09/18/2018    CO2 26 09/18/2018    BUN 18 09/18/2018    CR 1.26 09/18/2018       Lab Results   Component Value Date    BILITOTAL 0.5 09/18/2018     09/18/2018     09/18/2018    ALKPHOS 90 09/18/2018       Lab Results   Component Value Date    ALBUMIN 3.9 09/18/2018    PROTTOTAL 7.8 09/18/2018        Lab Results   Component Value Date    WBC 8.2 09/18/2018    HGB 14.3 09/18/2018    MCV 90 09/18/2018     09/18/2018 9/18/18  HAV ab neg  HBV SAg neg  HBV CAb neg  HCV Ab neg    TTG Ab neg    Radiology  Abd U/S Sep 2018 reviewed  MR enterography Sep 2018 reviewed    Assessment  28 year old male who presents for further evaluation and management of abnormal liver function tests most likely secondary to non-alcoholic fatty liver disease.  No clinical, biochemical or radiographic evidence of cirrhosis.  Will rule out other etiologies of chronic liver disease including hemochromatosis, autoimmune hepatitis and alpha-1 anti-trypsin deficiency.      We discussed the pathophysiology, natural history and management of NAFLD which is primarily weight loss with diet and exercise.      Plan  1.  Check CMP, INR, CBC  2.  Check iron studies, A1-AT phenotype, IgG, SAM, F-actin Ab  3.  Weight loss with diet and exercise  4.  Follow-up in 6 months    Iván Faria MD  Hepatology  [facility]

## 2018-10-24 ENCOUNTER — DOCUMENTATION ONLY (OUTPATIENT)
Dept: GASTROENTEROLOGY | Facility: CLINIC | Age: 28
End: 2018-10-24

## 2018-10-24 LAB
ANA SER QL IF: NEGATIVE
MITOCHONDRIA M2 IGG SER-ACNC: 1 U/ML

## 2018-10-25 ENCOUNTER — PATIENT OUTREACH (OUTPATIENT)
Dept: GASTROENTEROLOGY | Facility: CLINIC | Age: 28
End: 2018-10-25

## 2018-10-25 DIAGNOSIS — R10.84 ABDOMINAL PAIN, GENERALIZED: ICD-10-CM

## 2018-10-25 DIAGNOSIS — K21.00 GASTROESOPHAGEAL REFLUX DISEASE WITH ESOPHAGITIS: Primary | ICD-10-CM

## 2018-10-25 DIAGNOSIS — R19.7 DIARRHEA: ICD-10-CM

## 2018-10-25 LAB
ALBUMIN SERPL ELPH-MCNC: 4.5 G/DL (ref 3.7–5.1)
ALPHA1 GLOB SERPL ELPH-MCNC: 0.2 G/DL (ref 0.2–0.4)
ALPHA2 GLOB SERPL ELPH-MCNC: 0.7 G/DL (ref 0.5–0.9)
B-GLOBULIN SERPL ELPH-MCNC: 0.8 G/DL (ref 0.6–1)
GAMMA GLOB SERPL ELPH-MCNC: 1 G/DL (ref 0.7–1.6)
M PROTEIN SERPL ELPH-MCNC: 0 G/DL
PROT PATTERN SERPL ELPH-IMP: NORMAL
SMA IGG SER-ACNC: 7 UNITS (ref 0–19)

## 2018-10-25 NOTE — PROGRESS NOTES
Called pt and discussed with switch to ranitidine two times a day and okay to have egd  On November 21.                Put him on ranitidine 150 mg PO BID and get EGD in 3-4 weeks

## 2018-10-26 LAB
A1AT PHENOTYP SERPL-IMP: NORMAL
A1AT SERPL-MCNC: 105 MG/DL (ref 90–200)
A1AT SS SERPL-MCNC: NEGATIVE G/L
A1AT SZ SERPL-MCNC: NORMAL G/L
A1AT ZZ SERPL-MCNC: NEGATIVE G/L
CERULOPLASMIN SERPL-MCNC: 35 MG/DL (ref 23–53)
SPECIMEN SOURCE: NORMAL

## 2018-11-14 ENCOUNTER — TELEPHONE (OUTPATIENT)
Dept: GASTROENTEROLOGY | Facility: OUTPATIENT CENTER | Age: 28
End: 2018-11-14

## 2018-11-14 NOTE — TELEPHONE ENCOUNTER
"Patient taking any blood thinners ? No     Heart disease ? Denies     Lung disease ? Denies       Sleep apnea ? Denies     Diabetic ? Denies     Kidney disease ? \"Abnormal kidney labs\"    Dialysis ? N/a     Electronic implanted medical devices ? Denies     Are you taking any narcotic pain medication ?  No  What is your daily dosage ?    PTSD ? N/a     Prep instructions reviewed with patient ? Instructions, policy,MAC sedation plan reviewed. Advised patient to have someone stay with them post exam.    Pharmacy : N/a     Indication for procedure :   Chronic diarrhea [K52.9]  - Primary          Abdominal pain, generalized [R10.84]                 Referring provider : Self     Arrival Time : 12 PM      "

## 2018-11-21 ENCOUNTER — TRANSFERRED RECORDS (OUTPATIENT)
Dept: HEALTH INFORMATION MANAGEMENT | Facility: CLINIC | Age: 28
End: 2018-11-21

## 2018-11-21 ENCOUNTER — DOCUMENTATION ONLY (OUTPATIENT)
Dept: GASTROENTEROLOGY | Facility: OUTPATIENT CENTER | Age: 28
End: 2018-11-21
Payer: COMMERCIAL

## 2018-11-27 LAB — COPATH REPORT: NORMAL

## 2018-11-28 ENCOUNTER — TELEPHONE (OUTPATIENT)
Dept: GASTROENTEROLOGY | Facility: CLINIC | Age: 28
End: 2018-11-28

## 2018-12-03 ENCOUNTER — OFFICE VISIT (OUTPATIENT)
Dept: GASTROENTEROLOGY | Facility: CLINIC | Age: 28
End: 2018-12-03
Payer: COMMERCIAL

## 2018-12-03 ENCOUNTER — TELEPHONE (OUTPATIENT)
Dept: GASTROENTEROLOGY | Facility: CLINIC | Age: 28
End: 2018-12-03

## 2018-12-03 ENCOUNTER — CARE COORDINATION (OUTPATIENT)
Dept: GASTROENTEROLOGY | Facility: CLINIC | Age: 28
End: 2018-12-03

## 2018-12-03 VITALS
TEMPERATURE: 98.4 F | HEIGHT: 72 IN | SYSTOLIC BLOOD PRESSURE: 134 MMHG | DIASTOLIC BLOOD PRESSURE: 79 MMHG | OXYGEN SATURATION: 98 % | HEART RATE: 54 BPM | WEIGHT: 214 LBS | BODY MASS INDEX: 28.99 KG/M2

## 2018-12-03 DIAGNOSIS — A04.8 H. PYLORI INFECTION: Primary | ICD-10-CM

## 2018-12-03 DIAGNOSIS — K58.2 IRRITABLE BOWEL SYNDROME WITH BOTH CONSTIPATION AND DIARRHEA: ICD-10-CM

## 2018-12-03 DIAGNOSIS — F41.9 ANXIETY: Primary | ICD-10-CM

## 2018-12-03 RX ORDER — NORTRIPTYLINE HCL 25 MG
25 CAPSULE ORAL AT BEDTIME
Qty: 90 CAPSULE | Refills: 1 | Status: SHIPPED | OUTPATIENT
Start: 2018-12-03

## 2018-12-03 RX ORDER — NICOTINE POLACRILEX 4 MG/1
GUM, CHEWING ORAL
Qty: 90 TABLET | Refills: 3 | Status: SHIPPED | OUTPATIENT
Start: 2018-12-03

## 2018-12-03 RX ORDER — NITAZOXANIDE 500 MG/1
500 TABLET ORAL 2 TIMES DAILY WITH MEALS
Qty: 20 TABLET | Refills: 0 | Status: SHIPPED | OUTPATIENT
Start: 2018-12-03 | End: 2018-12-13

## 2018-12-03 RX ORDER — DOXYCYCLINE 100 MG/1
100 CAPSULE ORAL DAILY
Qty: 20 CAPSULE | Refills: 0 | Status: SHIPPED | OUTPATIENT
Start: 2018-12-03

## 2018-12-03 RX ORDER — LEVOFLOXACIN 250 MG/1
250 TABLET, FILM COATED ORAL DAILY
Qty: 10 TABLET | Refills: 0 | Status: SHIPPED | OUTPATIENT
Start: 2018-12-03

## 2018-12-03 ASSESSMENT — ANXIETY QUESTIONNAIRES
GAD7 TOTAL SCORE: 10
5. BEING SO RESTLESS THAT IT IS HARD TO SIT STILL: SEVERAL DAYS
1. FEELING NERVOUS, ANXIOUS, OR ON EDGE: MORE THAN HALF THE DAYS
2. NOT BEING ABLE TO STOP OR CONTROL WORRYING: MORE THAN HALF THE DAYS
3. WORRYING TOO MUCH ABOUT DIFFERENT THINGS: MORE THAN HALF THE DAYS
GAD7 TOTAL SCORE: 10
7. FEELING AFRAID AS IF SOMETHING AWFUL MIGHT HAPPEN: NOT AT ALL
7. FEELING AFRAID AS IF SOMETHING AWFUL MIGHT HAPPEN: NOT AT ALL
6. BECOMING EASILY ANNOYED OR IRRITABLE: MORE THAN HALF THE DAYS
GAD7 TOTAL SCORE: 10
4. TROUBLE RELAXING: SEVERAL DAYS

## 2018-12-03 ASSESSMENT — PATIENT HEALTH QUESTIONNAIRE - PHQ9
SUM OF ALL RESPONSES TO PHQ QUESTIONS 1-9: 4
10. IF YOU CHECKED OFF ANY PROBLEMS, HOW DIFFICULT HAVE THESE PROBLEMS MADE IT FOR YOU TO DO YOUR WORK, TAKE CARE OF THINGS AT HOME, OR GET ALONG WITH OTHER PEOPLE: NOT DIFFICULT AT ALL
SUM OF ALL RESPONSES TO PHQ QUESTIONS 1-9: 4

## 2018-12-03 ASSESSMENT — PAIN SCALES - GENERAL: PAINLEVEL: NO PAIN (0)

## 2018-12-03 NOTE — PATIENT INSTRUCTIONS
It was a pleasure taking care of you today.  I've included a brief summary of our discussion and care plan from today's visit below.  Please review this information with your primary care provider.  ______________________________________________________________________    My recommendations are summarized as follows:    -- Can continue zantac for now    -- Recommend soluble fiber supplementation on a daily basis (Metamucil, citrucel or benefiber).  Start with 1 tablespoon per day and if tolerated, may increase up to 2-3 tablespoons per day.  You may experience some bloating with initiation of fiber supplementation that will improve over the first month.  A good fiber trial to evaluate the effect is 3-6 months.    -- Recommend a trial of Miralax.  This is not a stimulant laxative and is safe to take on a daily basis.  Try 1 cap(s) full every day.  You can titrate this dose (increase or decrease) based on stool frequency and consistency.    -- Start Nortriptyline 25 mg daily    -- Food and symptom journal    Reflux/heartburn/GERD relief:  1) Prop actual bed up, not just with pillows.  Propping up with just pillows can actually make things worse if it is causing you to bend at the waist while sleeping  2) Avoid alcohol, as this causes relaxation of the sphincter and makes it easier for food to travel up esophagus.  If you do drink alcohol, do not drink before bed or before meals.  3)  Eat small meals. Avoid overeating.  4) Avoid triggers such as fatty foods, processed foods, and high fructose corn syrup (or food that contain these)  5) Weight loss       Return to GI Clinic in 3 months to review your progress.    ______________________________________________________________________    Who do I call with any questions after my visit?  Please be in touch if there are any further questions that arise following today's visit.  There are multiple ways to contact your gastroenterology care team.        During business hours,  you may reach a Gastroenterology nurse at [phone], option 3.       To schedule or reschedule an appointment, please call [phone].       You can always send a secure message through [employer].  [employer] messages are answered by your nurse or doctor typically within 24 hours.  Please allow extra time on weekends and holidays.        For urgent/emergent questions after business hours, you may reach the on-call GI Fellow by contacting the [facility]  at [phone].      In order for your refill to be processed in a timely fashion, it is your responsibility to ensure you follow the recommendations from your provider regarding your laboratory studies and follow up appointments.       How will I get the results of any tests ordered?    You will receive all of your results.  If you have signed up for [employer], any tests ordered at your visit will be available to you after your physician reviews them.  Typically this takes 1-2 weeks.  If there are urgent results that require a change in your care plan, your physician or nurse will call you to discuss the next steps.      What is [employer]?  [employer] is a secure way for you to access all of your healthcare records from the [facility].  It is a web based computer program, so you can sign on to it from any location.  It also allows you to send secure messages to your care team.  I recommend signing up for [employer] access if you have not already done so and are comfortable with using a computer.      How to I schedule a follow-up visit?  If you did not schedule a follow-up visit today, please call [phone] to schedule a follow-up office visit.        Sincerely,    Alvin Patel PA-C  [facility]  Division of Gastroenterology

## 2018-12-03 NOTE — LETTER
12/3/2018       RE: Sathya Quintana  [address] [place] [address]     Dear Colleague,    Thank you for referring your patient, Sathya Quintana, to the [facility] GASTRO[facility] IBD [facility]INIC at [facility]. Please see a copy of my visit note below.    GI CLINIC VISIT    CC/REFERRING MD:  Referred Self  REASON FOR FOLLOW UP:  Change in bowel habits, endoscopic findings and history of H. Pylori.    ASSESSMENT/PLAN:  28-year-old male with history of CKD from unknown cause (although upon review from nephrology, question this diagnosis given larger size), who presents for follow-up regarding change in bowel habits, endoscopic findings and history of H. Pylori.    1.  Change in bowel habits: Consistent with irritable bowel syndrome, mixed type.  Infectious studies normal, fecal fat testing within normal limits, MR enterography normal no evidence of inflammation on previous colonoscopy and fecal calprotectin within normal limits.  Patient symptoms originally stemmed from an episode of constipation, leading to loose stools which seem to be an ongoing pattern.  There does seem to be some dietary triggers however difficult to identify as elimination diets are difficult to complete for the patient.  Fiber supplementation suggested to help with consistency of the stool, and to consider MiraLAX during times of more firm, less frequent bowel movements.  He has met with our dietitian at original consultation, encourage continued to help identify potential triggers.  He has also found that anxiety has led to increased symptoms, especially with loose stool.  We will proceed with a trial of nortriptyline to see if this allows for continued improvement.  --Start nortriptyline 25 mg daily.  --Continue with ongoing visits with health psychology  --Continue ongoing visits with GI dietitian  --Recommend food and symptom journal  -- Recommend soluble fiber supplementation on a daily basis  (Metamucil, citrucel or benefiber).  Start with 1 tablespoon per day and if tolerated, may increase up to 2-3 tablespoons per day.  You may experience some bloating with initiation of fiber supplementation that will improve over the first month.  A good fiber trial to evaluate the effect is 3-6 months.  -- Recommend a trial of Miralax.  This is not a stimulant laxative and is safe to take on a daily basis.  Try 1 cap(s) full every day/every other day.  You can titrate this dose (increase or decrease) based on stool frequency and consistency.    2.  H. Pylori: Despite negative stool antigen, repeat upper endoscopy shows positive H. pylori infection on biopsies confirming his first treatment did not allow for eradication.  Dr. Pérez confirmed with patient's nephrologist regarding PPI therapy and its requirement and quadruple therapy to ensure eradication.  Nephrology agreed to PPI therapy for treatment and for ongoing antiacid treatment given his Lynne's of the esophagus.  We will proceed with levofloxacin quadruple therapy for 10 days.  Because his stool antigen was negative and biopsies were positive on most recent upper endoscopy, we will need to repeat endoscopic assessment to ensure eradication.  --Levofloxacin quadruple therapy times 10 days that includes: Levofloxacin 250 mg once daily, doxycycline 100 mg daily, nitazoxanide 500 mg twice daily, omeprazole 40 mg once daily.  --Upper endoscopy to be repeated in the next 8-12 weeks to ensure eradication.  This has been ordered and to be scheduled.    3.  Lynne's of the esophagus (nondysplastic), GERD without esophagitis: Encourage life long antiacid regimen.  Confirmed with patient's nephrologist that CKD may not be consistent with patient's course and patient may likely have a higher creatinine baseline based on his size.  Nephrology confirmed ongoing PPI therapy (20 mg daily) okay to continue.  --Once patient has completed H. pylori quadruple therapy, moved  to omeprazole 20 mg daily.  --Surveillance with upper endoscopy should be performed every 3 years.     4.  Elevated LFTs: Patient has been evaluated by hepatology, felt to be nonalcoholic fatty liver disease.  --Encourage weight loss, frequent exercise and decrease intake of high fructose corn syrup (especially ongoing frequency of soda pop)    5. Colorectal cancer screening: No personal family history of colon cancer.  Normal colonoscopy at outside institution.  Patient would require repeat colonoscopy at the age of 48, sooner if symptomatic.    RTC 3 months    Thank you for this consultation.  It was a pleasure to participate in the care of this patient; please contact us with any further questions.       Alvin Patel PA-C  Division of Gastroenterology, Hepatology and Nutrition  [facility]      HPI  28-year-old male with history of CKD from unknown cause (although upon review from nephrology, question this diagnosis given larger size), who presents for follow-up regarding change in bowel habits, endoscopic findings and history of H. Pylori.    IRREGULAR BOWEL PATTERN:  Briefly, the patient's history includes symptoms regards to change in bowel habits that began approximately 5 years ago.  He remembers around this time he was on vacation with extreme constipation and from that time he was unable to maintain a regular bowel pattern frequently changing from constipation to loose stools.  He mentions the story today however upon original consultation with Dr. Pérez he mentions that this actually began after eating a cheeseburger 5 years ago prompting urgent bowel movements.  He has periods of time that he feels well with normal bowel pattern followed by several weeks of loose stools.  During periods of loose stool, he will have 1-2 stools that are described as [place] 7.  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  He has yet to identify any  "specific dietary triggers, however has a history of drinking significant amounts of mellow yellow, at least one large bottle per day but currently reports that this is much less (no mellow yellow for the last 10 days).  There has also been a question regarding increased symptoms with milk, however he has been unable to complete a dairy trial as he notes that \"dairy is in everything.\"  He had a colonoscopy in the [facility] showing a normal ileum and normal colon.  Biopsy of the colon was normal.  Additional workup at [facility] included stool studies that were unremarkable including infection studies that were negative.  Spot fecal fat test was elevated and normal pancreatic elastase.      Repeat testing at this institution showed normal neutral and split fat, pancreatic elastase, fecal calprotectin, 24-hour fecal fat testing, stool alpha-1 antitrypsin, H. pylori stool antigen (although repeat endoscopy does show positive again), negative infectious studies. Normal MRE.    Currently he is having 1 formed stool per day without blood in the stool.  He notes that he is currently feeling quite well in regards to his bowel pattern.  He notes that stress is a big trigger for symptoms and he is meeting with a health psychologist today.    REFLUX + HPYLORI HX +THOMAS'S ESOPHAGUS:  Upper endoscopy at outside hospital showed grade B esophagitis.  Biopsies consistent with Thomas's esophagus and positive H. pylori.  Duodenal biopsies negative for celiac.  H. pylori previously treated with triple therapy, spiking creatinine rise in renal failure, creatinine returned back to baseline.  There was hypothesis that this may be from dehydration related to looser stools with antibiotic regimen for H. pylori.  He did not tolerate the triple antibiotic therapy for H. pylori very well but he did complete it.    Repeat upper endoscopy 11/21/18 given persistent heartburn symptoms again showed positive H. Pylori " (despite stool antigen normal) and Lynne's esophagus.    He has a history of frequent reflux however today he notes that it has been much improved but when it occurs it is quite severe triggered by some specifics to include a Coca-Cola and whiskey.  Symptoms are worse at night.  Prior to the endoscopy, patient was to continue a PPI 8 weeks prior however nephrologist was concerned regarding his kidney function so he took ranitidine 150 mg twice daily prior to the endoscopy on 11/21/2018.  He did not have heartburn symptoms during this time.  He has not used any antacid regimen for the last week and has noted some increase in heartburn.  He denies any dysphasia.    ELEVATED LFTS:  At consultation and evaluation of laboratory studies, , .  Follow-up serologic laboratory studies within normal limits.  He was evaluated by hematology, felt to be secondary to nonalcoholic fatty liver disease.  No clinical, biochemical or radiographic evidence of cirrhosis.    ROS:    No fevers or chills  No weight loss  No blurry vision, double vision or change in vision  No sore throat  No lymphadenopathy  No headache, paraesthesias, or weakness in a limb  No shortness of breath or wheezing  No chest pain or pressure  No arthralgias or myalgias  No rashes or skin changes  No odynophagia or dysphagia  No BRBPR, hematochezia, melena  No dysuria, frequency or urgency  No hot/cold intolerance or polyria  + anxiety     PROBLEM LIST  1. IBS-Mixed type  2. GERD without esophagitis  3. Barretts of the esophagus  4. Elevated LFTs  5. Hpylori    PERTINENT PAST MEDICAL HISTORY:  Past Medical History:   Diagnosis Date     Irritable bowel syndrome      Overweight        PREVIOUS SURGERIES:  Past Surgical History:   Procedure Laterality Date     TONSILLECTOMY       ALLERGIES:     Allergies   Allergen Reactions     Penicillin G Unknown     Allergy when he was very young does not know reaction       PERTINENT MEDICATIONS:    Current  Outpatient Prescriptions:      acetaminophen (TYLENOL) 325 MG tablet, Take by mouth as needed, Disp: , Rfl:      calcium carbonate (TUMS) 500 MG chewable tablet, Take by mouth as needed, Disp: , Rfl:      nortriptyline (PAMELOR) 25 MG capsule, Take 1 capsule (25 mg) by mouth At Bedtime, Disp: 90 capsule, Rfl: 1     ranitidine (ZANTAC) 150 MG tablet, Take 1 tablet (150 mg) by mouth 2 times daily, Disp: 180 tablet, Rfl: 3    SOCIAL HISTORY:  He is a romero, and he plants corn and beans.  He drinks socially alcohol.  He does not smoke.  He does not use marijuana.   Social History     Social History     Marital status:      Spouse name: N/A     Number of children: N/A     Years of education: N/A     Occupational History     Not on file.     Social History Main Topics     Smoking status: Former Smoker     Smokeless tobacco: Former User     Alcohol use 1.2 - 3.6 oz/week     1 - 3 Cans of beer, 1 - 3 Shots of liquor per week      Comment: socially     Drug use: No     Sexual activity: Not on file     Other Topics Concern     Not on file     Social History Narrative       FAMILY HISTORY:  Mother has a unknown history of liver issues.  He does not know any details.  A sister with cancer of the kidney, again he does not know any details.  The whole family has heartburn.  No history of Crohn's or ulcerative colitis.  No history of colon cancer or colon polyps.  No history of stomach cancer, esophageal cancer.  No history of celiac that he is aware of.   Family History   Problem Relation Age of Onset     Type 2 Diabetes Father      Kidney Cancer Sister      Breast Cancer Maternal Grandmother      Coronary Artery Disease Early Onset Maternal Grandfather      Liver Disease No family hx of      Colon Cancer No family hx of        Past/family/social history reviewed and no changes    PHYSICAL EXAMINATION:  Constitutional: aaox3, cooperative, pleasant, not dyspneic/diaphoretic, no acute distress  Vitals reviewed: /79   "Pulse 54  Temp 98.4  F (36.9  C) (Oral)  Ht 1.825 m (5' 11.85\")  Wt 97.1 kg (214 lb)  SpO2 98%  BMI 29.14 kg/m2  Wt:   Wt Readings from Last 2 Encounters:   12/03/18 97.1 kg (214 lb)   10/23/18 97.1 kg (214 lb)      Eyes: Sclera anicteric/injected  Ears/nose/mouth/throat: Normal oropharynx without ulcers or exudate, mucus membranes moist, hearing intact  Neck: supple, thyroid normal size  CV: No edema  Respiratory: Unlabored breathing  Lymph: No axillary, submandibular, supraclavicular or inguinal lymphadenopathy  Abd: Nondistended, +bs, no hepatosplenomegaly, nontender, no peritoneal signs  Skin: warm, perfused, no jaundice  Psych: Normal affect  MSK: Normal gait      PERTINENT STUDIES:    Documentation Only on 11/21/2018   Component Date Value Ref Range Status     Copath Report 11/21/2018    Final                    Value:Patient Name: DALLIN NINO  MR#: [id number]  Specimen #: [id number]  Collected: 11/21/2018  Received: 11/23/2018  Reported: 11/27/2018 10:07  Ordering Phy(s): RANDY LILLY    For improved result formatting, select 'View Enhanced Report Format' under   Linked Documents section.    SPECIMEN(S):  A: Duodenal biopsy, 2nd and 3rd portion  B: Duodenal biopsy, bulb  C: Stomach biopsy  D: Esophageal biopsy at 38-39 cm    FINAL DIAGNOSIS:  A. DUODENAL BIOPSY, 2ND AND 3RD PORTION:  - Duodenal mucosa with no significant histologic abnormality    B. DUODENAL BIOPSY, BULB:  - Peptic duodenitis with moderate active inflammation  - No evidence of H. pylori like organisms identified on   immunohistochemical staining    C. STOMACH BIOPSY:  - Moderate chronic active gastritis  - H. pylori like organisms identified on immunohistochemical staining  - Negative for intestinal metaplasia or dysplasia    D. ESOPHAGEAL BIOPSY AT 38-39 CM:  - Metaplastic columnar epithelium with goblet cells, c                          onsistent with   Lynne's esophagus  - Negative for dysplasia    I have " "personally reviewed all specimens and/or slides, including the   listed special stains, and used them  with my medical judgement to determine or confirm the final diagnosis.    Electronically signed out by:    Munira Acuna M.D., PhD, Physicians    CLINICAL HISTORY:  Reflux and irregular bowel habits  GROSS:  A: The specimen is received in formalin with proper patient   identification, labeled \"second and third portion  of duodenum\".  The specimen consists of three tan-brown pieces of   irregular soft tissue ranging from 0.3-0.4  cm in greatest dimension.  Specimen is entirely submitted in cassette A1.    B: The specimen is received in formalin with proper patient   identification, labeled \"bulb\".  The specimen  consists of six tan-brown pieces of irregular soft tissue ranging from   0.2-0.4 cm in greatest dimension.  The  specimen is entirely submitted in cassette B1.    C: The specimen is received in formal                          in with proper patient   identification, labeled \"gastric biopsy\".  The  specimen consists of six tan-brown pieces of irregular soft tissue ranging   from 0.3-oh 0.5 cm in greatest  dimension.  The specimen is entirely submitted in cassette C1.    D: The specimen is received in formalin with proper patient   identification, labeled \"gastroesophageal  junction\".  The specimen consists of five tan-brown irregular pieces of   soft tissue ranging from 0.2-0.4 cm in  greatest dimension.  The specimen is entirely submitted in cassette D1.   (Dictated by: Grupo Toro  11/23/2018 09:02 AM)    MICROSCOPIC:  Microscopic examination was performed. An immunohistochemical stain for H.   pylori is performed on parts B and  C with proper controls. Part B is negative for H. pylori. Part C is   positive.    CPT Codes:  A: 85619-SV1  B: 00519-LO1, 06877-DEG  C: 72061-AM3, 89219-TVX  D: 37322-LB7    TESTING LAB LOCATION:  [facility], [facility] [address] [place]   [id number] [phone]    COLLECTION SITE:  Client: [facility] [place]  Location: [facility] (B)    Resident  [id number]         Again, thank you for allowing me to participate in the care of your patient.      Sincerely,    Alvin Patel PA-C

## 2018-12-03 NOTE — PATIENT INSTRUCTIONS
1. Consider use of diaphragmatic breathing   - [id number] may be a free kenn that can help with use of this skill     2. Consider use of book to develop skills for coping with chronic digestive illness  Controlling IBS the Drug-Free Way: A 10-Step Plan for Symptom Relief, Tito Wang  https://www.[employer]/Controlling-IBS-Drug-Free-Way-10-Step/dp/Y312DJXMWI/ref=cm_cr_arp_d_product_top?ie=UTF8

## 2018-12-03 NOTE — NURSING NOTE
"Chief Complaint   Patient presents with     RECHECK     Return patient. post EGD        Vitals:    12/03/18 1400   BP: 134/79   Pulse: 54   Temp: 98.4  F (36.9  C)   TempSrc: Oral   SpO2: 98%   Weight: 97.1 kg (214 lb)   Height: 1.825 m (5' 11.85\")       Body mass index is 29.14 kg/(m^2).    ROULA Lowry                          "

## 2018-12-03 NOTE — MR AVS SNAPSHOT
After Visit Summary   12/3/2018    Sathya Quintana    MRN: [id number]           Patient Information     Date Of Birth          1990        Visit Information        Provider Department      12/3/2018 1:00 PM Ivanna Mattson, PhD [facility] Gastroenterology [facility] IBD [facility]        Care Instructions    1. Consider use of diaphragmatic breathing   - [id number]     2. Consider use of book for coping with IBS  Controlling IBS the Drug-Free Way: A 10-Step Plan for Symptom Relief, Tito Wang  https://wwwSafetyPay/Controlling-IBS-Drug-Free-Way-10-Step/dp/N292DVVOCL/ref=cm_cr_arp_d_product_top?ie=UTF8          Follow-ups after your visit        Your next 10 appointments already scheduled     Dec 03, 2018  2:20 PM CST   (Arrive by 2:05 PM)   Return Visit with Alvin Patel PA-C   [facility] Gastroenterology [facility] IBD Clinic ([facility])    [address]  4th Floor  [facility] [address] [phone]            Apr 02, 2019 10:00 AM CDT   Lab with UC LAB   [facility] Lab ([facility])    [address]  1st Floor  [facility] [address] [phone]            Apr 02, 2019 11:00 AM CDT   (Arrive by 10:45 AM)   Return General Liver with Iván Floyd MD   [facility] Hepatology ([facility])    [address] [place] [address]  [address]  [facility] [address] [phone]              Who to contact     Please call your clinic at [phone] to:    Ask questions about your health    Make or cancel appointments    Discuss your medicines    Learn about your test results    Speak to your doctor            Additional Information About Your Visit        Care EveryWhere ID     This is your Care EveryWhere ID. This could be used by other organizations to access your [place] medical records  [id number]         Blood Pressure from Last 3 Encounters:   10/23/18 125/67   09/18/18 128/87    Weight from Last 3  Encounters:   10/23/18 97.1 kg (214 lb)   09/18/18 100.2 kg (220 lb 12.8 oz)              Today, you had the following     No orders found for display       Primary Care Provider Office Phone # Fax #    Linwood Pina [phone]       [place] [address]        Equal Access to Services     [facility] CITLALI : Hadii aad ku hadasho Soomaali, waaxda luqadaha, qaybta kaalmada adeegyada, waxay idiin hayaan adeursula sarkarlisash la'aan ah. So [facility] [phone].    ATENCIÓN: Si habla español, tiene a cannon disposición servicios gratuitos de asistencia lingüística. Marthaame al [phone].    We comply with applicable federal civil rights laws and [place] laws. We do not discriminate on the basis of race, color, national origin, age, disability, sex, sexual orientation, or gender identity.            Thank you!     Thank you for choosing [facility] GASTROENTEROLOGY AND IBD CLINIC  for your care. Our goal is always to provide you with excellent care. Hearing back from our patients is one way we can continue to improve our services. Please take a few minutes to complete the written survey that you may receive in the mail after your visit with us. Thank you!             Your Updated Medication List - Protect others around you: Learn how to safely use, store and throw away your medicines at www.disposemymeds.org.          This list is accurate as of 12/3/18  1:54 PM.  Always use your most recent med list.                   Brand Name Dispense Instructions for use Diagnosis    acetaminophen 325 MG tablet    TYLENOL     Take by mouth as needed        calcium carbonate 500 MG chewable tablet    TUMS     Take by mouth as needed        ranitidine 150 MG tablet    ZANTAC    180 tablet    Take 1 tablet (150 mg) by mouth 2 times daily    Gastroesophageal reflux disease with esophagitis, Abdominal pain, generalized, Diarrhea

## 2018-12-03 NOTE — LETTER
12/3/2018       RE: Sathya Quintana  [address]  [facility] [address]     Dear Colleague,    Thank you for referring your patient, Sathya Quintana, to the [facility] GASTROENTEROLOGY AND IBD CLINIC at [facility]. Please see a copy of my visit note below.      Health Psychology                  Clinic    Department of Medicine  Chichi Brown, PhD, LP [phone]                          Clinics and Surgery Center  [facility] Lyle Boland, PhD, LP [phone]                  3rd Floor  [place] Mail [address]   Ritchie Zamudio, PhD, ABPP, LP [phone]     [address],   [address],  Ivanna Mattson,  PhD, LP [phone]            [place]  [address]  [place] [address] Meghann Castano, PhD, LP [phone]     Confidential Summary of Standard Psychodiagnostic Evaluation*    Referral Source:  iSmon Pérez MD    Reason for Referral:  Coping with Gi illness    Sources of Information:  Information was obtained from a clinical interview with the patient, review of the [facility] medical record, and administration of psychological assessments.     History of Presenting Concerns:  Sathya Quintana is a 28 year old male who presents with approximately 5 years of intermittent loose and urgent stools (see notes by Dr Pérez on 9/18/18) that have been conceptualized is likely IBS.  The patient reported that he believes his symptoms are stress induced, with flare of symptoms around stressors. Stressors that flare symptoms include travel to the [place], anticipation of activities, and work stress. He reported he jarrett with this by not eating prior to activities or errands, walking, or using restroom.  He reported that he jarrett with stress by [place] time [place] farm and recognizing that most of his stressors are time limited.    He reported that the impact of GI illness is reduced engagement and almas in  "social activities, low quality of life, fear of not having access to a restroom, embarrassment about symptoms, and significant anxiety about symptoms. He reported many \"close calls\" of bowel incontinence but no episodes of incontinence. He reported that he often keeps toilet paper in his tractor and does to the restroom in the [place] when necessary.     The Visercal Sensitivity Index was also adminstered to assess GI-specific anxiety, with symptoms endorsed that suggest strong agreement with the items that he has a difficult time enjoying himself because he cannot get his mind off of the discomfort in his abdomen as well as when he enters a new situation the first thing that he does is look for a bathroom.  He also endorsed moderate agreement with items assessing worry about distention and bloating following eating, anxiety about going to a restaurant, worry about GI problems, fear about developing uncomfortable abdominal sensations after eating, constant awareness of the sensations in his belly, and increase in GI discomfort when stressed. See EMR for scanned copy of this measure.     Medical History:    Past Medical History:   Diagnosis Date     Irritable bowel syndrome      Overweight        Past Surgical History:   Procedure Laterality Date     TONSILLECTOMY         Current Outpatient Prescriptions   Medication     acetaminophen (TYLENOL) 325 MG tablet     calcium carbonate (TUMS) 500 MG chewable tablet     ranitidine (ZANTAC) 150 MG tablet     No current facility-administered medications for this visit.        Psychiatric History:  He denied a history of past diagnosis or treatment for mental health problems including psychotropic medication, psychotherapy, or psychiatric hospitalization.  However, review of medical records mentions that he has begun to see a counselor near his home to talk about the stress in his life (see note by Dr. Pérez on 9/18/2018).  He reported no known family history of psychiatric " "illness.  Current mental health symptoms endorsed include moderate levels of generalized anxiety and minimal symptoms of depression.    Substance Use History:  He reported that he consumes alcohol socially and estimated his alcohol use at 2 drinks a week. He denied current or past use of drugs or tobacco products.  He denied personal history of substance abuse.  He endorsed family substance abuse history is significant in remote relatives in his maternal family.    Social History:  He reported that [place] outside of [place], [place] as the youngest of 4 children.  He has 3 older sisters.  He stated his parents are still  and described his childhood as \"fine\".  He attended the [employer] for college and met his wife during college.  They currently have a 9-month-old son.  He reported he has a good network of 4 friends that are also farmers with whom he talks daily.  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    Psychological Assessment:  The patient completed the following battery of assessments during this psychological evaluation: World Health Organization Disability Assessment Schedule 2.0 12-item (WHODAS), Patient Health Questionnaire-9 (PHQ-9), Generalized Anxiety Disorder-7 screener (SHARONA-7), and the CAGE Questionnaire Adapted to Include Drugs (CAGE-AID).    PHQ-9 SCORE 9/18/2018 12/3/2018   PHQ-9 Total Score MyChart 13 (Moderate depression) 4 (Minimal depression)   PHQ-9 Total Score 13 4       SHARONA-7 SCORE 12/3/2018   Total Score 10 (moderate anxiety)   Total Score 10     WHODAS 2.0 Total Score 12/3/2018   Total Score 15     CAGE-AID Total Score 12/3/2018 12/3/2018   Total Score 0 0   Total Score MyChart - 0 (A total score of 2 or greater is considered clinically significant)     CAGE-AID score  > 1 is a positive screen, suggesting further discussion is needed to determine if evaluation for alcohol or substance abuse is appropriate.  A " score > 2 is considered clinically significant, suggesting further evaluation of alcohol or substance-related problems is indicated.    Mental Status Examination:  Appearance/Behavior/Orientation: Patient was on time, appropriately groomed and dressed, and demonstrated good eye contact. Alert and oriented to person, place, time, and situation. No evidence of psychomotor agitation.     Cooperation/Reliability: Patient was open and cooperative throughout the session.    Speech/Language: Speech was clear, coherent, and of normal rate, rhythm and volume.    Thought Form: Overall logical and organized.   Thought Content: Appropriate to interview and situation.  Perception: Patient denied any difficulties with a thought disorder, including auditory or visual hallucinations.   Cognition/Memory: Not formally assessed, but no difficulties apparent upon interview.   Attention/Concentration: Good throughout interview.    Fund of knowledge: Consistent with age and level of education.    Abstract reasoning: Not assessed.   Judgment: Intact.    Mood/Affect: Mood euthymic; appropriate range of affect.    Insight/Motivation: Good, good  Suicide/Assault: Patient denies suicidal or assaultive ideation, plan, or intent    Impression:  Sathya Quintana is a 28 year old male who presents with approximate 5-year history of intermittent and urgent loose stools that per his self-report are significantly connected to episodes of stress and anxiety.  He presents with significant GI-specific anxiety and likely visceral hypersensitivity.  He also presents with moderate generalized anxiety yet reports minimal symptoms of depression.  Anxiety and visceral hypersensitivity may play a significant role in his experience of ongoing GI symptoms and he may benefit from incorporating interventions that improve brain gut communication such as cognitive behavioral therapy.    Diagnosis:  Anxiety, unspecified    Recommendation/Plan:  Recommended  cognitive behavioral therapy for anxiety and chronic digestive symptoms; intervention to include relaxation training, cognitive restructuring, and behavioral interventions.  Provided  information about rationale and course of care. Given distance from clinic, I provided recommendation for bibliotherapy for cognitive behavioral therapy and managing chronic digestive conditions. Provided brief instruction today regarding brain-gut connection, role of stress response and how it influences digestive functioning, and diaphragmatic breathing to improve ability to manage influence of stress response on GI symptoms.    It also may be beneficial for GI providers to discuss the role of a neuromodulator (e.g.,serotonin specific reuptake inhibitor) to help improve brain gut communication.      Ivanna Mattson, PhD,   Clinical Health Psychologist    *In accordance with the Rules of the [place] Board of Psychology, it is noted that psychological descriptions and scientific procedures underlying psychological evaluations have limitations.  Absolute predictions cannot be made based on information in this report.

## 2018-12-03 NOTE — MR AVS SNAPSHOT
After Visit Summary   12/3/2018    Sathya Quintana    MRN: [id number]           Patient Information     Date Of Birth          1990        Visit Information        Provider Department      12/3/2018 2:20 PM Alvin Patel PA-C [facility]astro[facility] IBD [facility]        Today's Diagnoses     H. pylori infection    -  1    Irritable bowel syndrome with both constipation and diarrhea          Care Instructions    It was a pleasure taking care of you today.  I've included a brief summary of our discussion and care plan from today's visit below.  Please review this information with your primary care provider.  ______________________________________________________________________    My recommendations are summarized as follows:    -- Can continue zantac for now    -- Recommend soluble fiber supplementation on a daily basis (Metamucil, citrucel or benefiber).  Start with 1 tablespoon per day and if tolerated, may increase up to 2-3 tablespoons per day.  You may experience some bloating with initiation of fiber supplementation that will improve over the first month.  A good fiber trial to evaluate the effect is 3-6 months.    -- Recommend a trial of Miralax.  This is not a stimulant laxative and is safe to take on a daily basis.  Try 1 cap(s) full every day.  You can titrate this dose (increase or decrease) based on stool frequency and consistency.    -- Start Nortriptyline 25 mg daily    -- Food and symptom journal    Reflux/heartburn/GERD relief:  1) Prop actual bed up, not just with pillows.  Propping up with just pillows can actually make things worse if it is causing you to bend at the waist while sleeping  2) Avoid alcohol, as this causes relaxation of the sphincter and makes it easier for food to travel up esophagus.  If you do drink alcohol, do not drink before bed or before meals.  3)  Eat small meals. Avoid overeating.  4) Avoid triggers such as fatty foods, processed foods, and high  fructose corn syrup (or food that contain these)  5) Weight loss       Return to GI Clinic in 3 months to review your progress.    ______________________________________________________________________    Who do I call with any questions after my visit?  Please be in touch if there are any further questions that arise following today's visit.  There are multiple ways to contact your gastroenterology care team.        During business hours, you may reach a Gastroenterology nurse at [phone], option 3.       To schedule or reschedule an appointment, please call [phone].       You can always send a secure message through [employer].  [employer] messages are answered by your nurse or doctor typically within 24 hours.  Please allow extra time on weekends and holidays.        For urgent/emergent questions after business hours, you may reach the on-call GI Fellow by contacting the [facility]  at [phone].      In order for your refill to be processed in a timely fashion, it is your responsibility to ensure you follow the recommendations from your provider regarding your laboratory studies and follow up appointments.       How will I get the results of any tests ordered?    You will receive all of your results.  If you have signed up for [employer], any tests ordered at your visit will be available to you after your physician reviews them.  Typically this takes 1-2 weeks.  If there are urgent results that require a change in your care plan, your physician or nurse will call you to discuss the next steps.      What is [employer]?  [employer] is a secure way for you to access all of your healthcare records from the [facility].  It is a web based computer program, so you can sign on to it from any location.  It also allows you to send secure messages to your care team.  I recommend signing up for [employer] access if you have not already done so and are comfortable with using a computer.      How to  I schedule a follow-up visit?  If you did not schedule a follow-up visit today, please call [phone] to schedule a follow-up office visit.        Sincerely,    Alvin Patel PA-C  [facility]  Division of Gastroenterology                Follow-ups after your visit        Additional Services     GASTROENTEROLOGY ADULT REF PROCEDURE ONLY       Last Lab Result: Creatinine (mg/dL)       Date                     Value                 10/23/2018               1.29 (H)         ----------  Body mass index is 29.14 kg/(m^2).     Needed:  No  Language:  English    Patient will be contacted to schedule procedure.     Please be aware that coverage of these services is subject to the terms and limitations of your health insurance plan.  Call member services at your health plan with any benefit or coverage questions.  Any procedures must be performed at a [facility] facility OR coordinated by your clinic's referral office.    Please bring the following with you to your appointment:    (1) Any X-Rays, CTs or MRIs which have been performed.  Contact the facility where they were done to arrange for  prior to your scheduled appointment.    (2) List of current medications   (3) This referral request   (4) Any documents/labs given to you for this referral                  Follow-up notes from your care team     Return in about 3 months (around 3/3/2019).      Your next 10 appointments already scheduled     Apr 02, 2019 10:00 AM CDT   Lab with  LAB   [facility] ([facility])    [address]  1st Floor  [facility] [address] [phone]            Apr 02, 2019 11:00 AM CDT   (Arrive by 10:45 AM)   Return General Liver with Iván Floyd MD   [facility] Hepatology ([facility])    [address]  [address]  [facility] [address] [phone]              Who to contact     Please call your clinic at [phone] to:    Ask  "questions about your health    Make or cancel appointments    Discuss your medicines    Learn about your test results    Speak to your doctor            Additional Information About Your Visit        Care EveryWhere ID     This is your Care EveryWhere ID. This could be used by other organizations to access your [place] medical records  [id number]        Your Vitals Were     Pulse Temperature Height Pulse Oximetry BMI (Body Mass Index)       54 98.4  F (36.9  C) (Oral) 1.825 m (5' 11.85\") 98% 29.14 kg/m2        Blood Pressure from Last 3 Encounters:   12/03/18 134/79   10/23/18 125/67   09/18/18 128/87    Weight from Last 3 Encounters:   12/03/18 97.1 kg (214 lb)   10/23/18 97.1 kg (214 lb)   09/18/18 100.2 kg (220 lb 12.8 oz)              We Performed the Following     GASTROENTEROLOGY ADULT REF PROCEDURE ONLY          Today's Medication Changes          These changes are accurate as of 12/3/18  3:03 PM.  If you have any questions, ask your nurse or doctor.               Start taking these medicines.        Dose/Directions    nortriptyline 25 MG capsule   Commonly known as:  PAMELOR   Used for:  Irritable bowel syndrome with both constipation and diarrhea   Started by:  Alvin Patel PA-C        Dose:  25 mg   Take 1 capsule (25 mg) by mouth At Bedtime   Quantity:  90 capsule   Refills:  1            Where to get your medicines      These medications were sent to [id number] - [place], [place] - [address] [address]     Phone:  [phone]     nortriptyline 25 MG capsule                Primary Care Provider Office Phone # Fax #    Linwood Pina [phone]       [address] [address]        Equal Access to Services     [facility] CITLLAI : Eugene Barahona, jaki washington, chavo magana, karina bishop. So [facility] [phone].    ATENCIÓN: Si margaritala espbettie, stephane a cannon " disposición servicios gratuitos de asistencia lingüística. Kimberly li [phone].    We comply with applicable federal civil rights laws and [place] laws. We do not discriminate on the basis of race, color, national origin, age, disability, sex, sexual orientation, or gender identity.            Thank you!     Thank you for choosing [facility] GASTROENTEROLOGY AND IBD CLINIC  for your care. Our goal is always to provide you with excellent care. Hearing back from our patients is one way we can continue to improve our services. Please take a few minutes to complete the written survey that you may receive in the mail after your visit with us. Thank you!             Your Updated Medication List - Protect others around you: Learn how to safely use, store and throw away your medicines at www.disposemymeds.org.          This list is accurate as of 12/3/18  3:03 PM.  Always use your most recent med list.                   Brand Name Dispense Instructions for use Diagnosis    acetaminophen 325 MG tablet    TYLENOL     Take by mouth as needed        calcium carbonate 500 MG chewable tablet    TUMS     Take by mouth as needed        nortriptyline 25 MG capsule    PAMELOR    90 capsule    Take 1 capsule (25 mg) by mouth At Bedtime    Irritable bowel syndrome with both constipation and diarrhea       ranitidine 150 MG tablet    ZANTAC    180 tablet    Take 1 tablet (150 mg) by mouth 2 times daily    Gastroesophageal reflux disease with esophagitis, Abdominal pain, generalized, Diarrhea

## 2018-12-03 NOTE — PROGRESS NOTES
I called Sathya's nephrologist Dr. Moss in [place] to discuss use of PPI (given persistent h pylori infection) and Lynne's esophagus.    Dr. Moss is ok with using PPI therapy. Sathya has a Cr of 1.29 which may be his baseline given he is a young, muscular male.    We will proceed with quadruple therapy for h pylori followed by omeprazole 20 mg daily.    Repeat EGD in 3-4 months to check for eradication of h pylori given he had a neg h pylori antigen and this was only seen on biopsies.    Simon Pérez MD    [facility]  Division of Gastroenterology, Hepatology and Nutrition

## 2018-12-03 NOTE — PROGRESS NOTES
"  Health Psychology                  Clinic    Department of Medicine  Chichi Brown, PhD, LP [phone]                          [facility]  [facility] Lyle Boland, PhD, LP [phone]                  3rd Floor  [place] Mail Code 744   Ritchie Zamudio, PhD, ABPP, LP [phone]     [address] [facility],   [address],  Ivanna Mattson,  PhD, LP [phone]            [place] [address] Meghann Castano, PhD, LP [phone]     Confidential Summary of Standard Psychodiagnostic Evaluation*    Referral Source:  Simon Pérez MD    Reason for Referral:  Coping with Gi illness    Sources of Information:  Information was obtained from a clinical interview with the patient, review of the [facility] medical record, and administration of psychological assessments.     History of Presenting Concerns:  Sathya Quintana is a 28 year old male who presents with approximately 5 years of intermittent loose and urgent stools (see notes by Dr Pérez on 9/18/18) that have been conceptualized is likely IBS.  The patient reported that he believes his symptoms are stress induced, with flare of symptoms around stressors. Stressors that flare symptoms include travel to the [place], anticipation of activities, and work stress. He reported he jarrett with this by not eating prior to activities or errands, walking, or using restroom.  He reported that he jarrett with stress [place] and recognizing that most of his stressors are time limited.    He reported that the impact of GI illness is reduced engagement and almas in social activities, low quality of life, fear of not having access to a restroom, embarrassment about symptoms, and significant anxiety about symptoms. He reported many \"close calls\" of bowel incontinence but no episodes of incontinence. He reported that he often keeps toilet paper in his tractor and does " to the [place]room [place] when necessary.     The Visercal Sensitivity Index was also adminstered to assess GI-specific anxiety, with symptoms endorsed that suggest strong agreement with the items that he has a difficult time enjoying himself because he cannot get his mind off of the discomfort in his abdomen as well as when he enters a new situation the first thing that he does is look for a bathroom.  He also endorsed moderate agreement with items assessing worry about distention and bloating following eating, anxiety about going to a restaurant, worry about GI problems, fear about developing uncomfortable abdominal sensations after eating, constant awareness of the sensations in his belly, and increase in GI discomfort when stressed. See EMR for scanned copy of this measure.     Medical History:    Past Medical History:   Diagnosis Date     Irritable bowel syndrome      Overweight        Past Surgical History:   Procedure Laterality Date     TONSILLECTOMY         Current Outpatient Prescriptions   Medication     acetaminophen (TYLENOL) 325 MG tablet     calcium carbonate (TUMS) 500 MG chewable tablet     ranitidine (ZANTAC) 150 MG tablet     No current facility-administered medications for this visit.        Psychiatric History:  He denied a history of past diagnosis or treatment for mental health problems including psychotropic medication, psychotherapy, or psychiatric hospitalization.  However, review of medical records mentions that he has begun to see a counselor near his home to talk about the stress in his life (see note by Dr. Pérez on 9/18/2018).  He reported no known family history of psychiatric illness.  Current mental health symptoms endorsed include moderate levels of generalized anxiety and minimal symptoms of depression.    Substance Use History:  He reported that he consumes alcohol socially and estimated his alcohol use at 2 drinks a week. He denied current or past use of drugs or tobacco  "products.  He denied personal history of substance abuse.  He endorsed family substance abuse history is significant in remote relatives in his maternal family.    Social History:  He reported that [place], [place] as the youngest of 4 children.  He has 3 older sisters.  He stated his parents are still  and described his childhood as \"fine\".  He attended the [employer] for college and met his wife during college.  They currently have a 9-month-old son.  He reported he has a good network of 4 friends that are also farmers with whom he talks daily.  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    Psychological Assessment:  The patient completed the following battery of assessments during this psychological evaluation: World Health Organization Disability Assessment Schedule 2.0 12-item (WHODAS), Patient Health Questionnaire-9 (PHQ-9), Generalized Anxiety Disorder-7 screener (SHARONA-7), and the CAGE Questionnaire Adapted to Include Drugs (CAGE-AID).    PHQ-9 SCORE 9/18/2018 12/3/2018   PHQ-9 Total Score MyChart 13 (Moderate depression) 4 (Minimal depression)   PHQ-9 Total Score 13 4       SHARONA-7 SCORE 12/3/2018   Total Score 10 (moderate anxiety)   Total Score 10     WHODAS 2.0 Total Score 12/3/2018   Total Score 15     CAGE-AID Total Score 12/3/2018 12/3/2018   Total Score 0 0   Total Score MyChart - 0 (A total score of 2 or greater is considered clinically significant)     CAGE-AID score  > 1 is a positive screen, suggesting further discussion is needed to determine if evaluation for alcohol or substance abuse is appropriate.  A score > 2 is considered clinically significant, suggesting further evaluation of alcohol or substance-related problems is indicated.    Mental Status Examination:  Appearance/Behavior/Orientation: Patient was on time, appropriately groomed and dressed, and demonstrated good eye contact. Alert and oriented to " person, place, time, and situation. No evidence of psychomotor agitation.     Cooperation/Reliability: Patient was open and cooperative throughout the session.    Speech/Language: Speech was clear, coherent, and of normal rate, rhythm and volume.    Thought Form: Overall logical and organized.   Thought Content: Appropriate to interview and situation.  Perception: Patient denied any difficulties with a thought disorder, including auditory or visual hallucinations.   Cognition/Memory: Not formally assessed, but no difficulties apparent upon interview.   Attention/Concentration: Good throughout interview.    Fund of knowledge: Consistent with age and level of education.    Abstract reasoning: Not assessed.   Judgment: Intact.    Mood/Affect: Mood euthymic; appropriate range of affect.    Insight/Motivation: Good, good  Suicide/Assault: Patient denies suicidal or assaultive ideation, plan, or intent    Impression:  Sathya Quintana is a 28 year old male who presents with approximate 5-year history of intermittent and urgent loose stools that per his self-report are significantly connected to episodes of stress and anxiety.  He presents with significant GI-specific anxiety and likely visceral hypersensitivity.  He also presents with moderate generalized anxiety yet reports minimal symptoms of depression.  Anxiety and visceral hypersensitivity may play a significant role in his experience of ongoing GI symptoms and he may benefit from incorporating interventions that improve brain gut communication such as cognitive behavioral therapy.    Diagnosis:  Anxiety, unspecified    Recommendation/Plan:  Recommended cognitive behavioral therapy for anxiety and chronic digestive symptoms; intervention to include relaxation training, cognitive restructuring, and behavioral interventions.  Provided information about rationale and course of care. Given distance from clinic, I provided recommendation for bibliotherapy for cognitive  behavioral therapy and managing chronic digestive conditions. Provided brief instruction today regarding brain-gut connection, role of stress response and how it influences digestive functioning, and diaphragmatic breathing to improve ability to manage influence of stress response on GI symptoms.    It also may be beneficial for GI providers to discuss the role of a neuromodulator (e.g.,serotonin specific reuptake inhibitor) to help improve brain gut communication.      Ivanna Mattson, PhD,   Clinical Health Psychologist    *In accordance with the Rules of the [place] Board [employer], it is noted that psychological descriptions and scientific procedures underlying psychological evaluations have limitations.  Absolute predictions cannot be made based on information in this report.

## 2018-12-03 NOTE — TELEPHONE ENCOUNTER
Health Call Center    Phone Message    May a detailed message be left on voicemail: yes    Reason for Call: Medication Question or concern regarding medication   Prescription Clarification  Name of Medication: doxycycline hyclate (VIBRAMYCIN) 100 MG capsule  Prescribing Provider: Alvin Patel   Pharmacy: [facility]   What on the order needs clarification? Manju calling to confirm orders as 1x per day vs 2x per day    Name of Medication: omeprazole 20 MG tablet  Prescribing Provider: Alvin Patel   Pharmacy: [facility]   What on the order needs clarification?  Manju wants to dispense capsules vs tablets of this medication.    Please call Manju back to discuss both medications as soon as possible          Action Taken: Message routed to:  [facility] ([facility]

## 2018-12-03 NOTE — PROGRESS NOTES
GI CLINIC VISIT    CC/REFERRING MD:  Referred Self  REASON FOR FOLLOW UP:  Change in bowel habits, endoscopic findings and history of H. Pylori.    ASSESSMENT/PLAN:  28-year-old male with history of CKD from unknown cause (although upon review from nephrology, question this diagnosis given larger size), who presents for follow-up regarding change in bowel habits, endoscopic findings and history of H. Pylori.    1.  Change in bowel habits: Consistent with irritable bowel syndrome, mixed type.  Infectious studies normal, fecal fat testing within normal limits, MR enterography normal no evidence of inflammation on previous colonoscopy and fecal calprotectin within normal limits.  Patient symptoms originally stemmed from an episode of constipation, leading to loose stools which seem to be an ongoing pattern.  There does seem to be some dietary triggers however difficult to identify as elimination diets are difficult to complete for the patient.  Fiber supplementation suggested to help with consistency of the stool, and to consider MiraLAX during times of more firm, less frequent bowel movements.  He has met with our dietitian at original consultation, encourage continued to help identify potential triggers.  He has also found that anxiety has led to increased symptoms, especially with loose stool.  We will proceed with a trial of nortriptyline to see if this allows for continued improvement.  --Start nortriptyline 25 mg daily.  --Continue with ongoing visits with health psychology  --Continue ongoing visits with GI dietitian  --Recommend food and symptom journal  -- Recommend soluble fiber supplementation on a daily basis (Metamucil, citrucel or benefiber).  Start with 1 tablespoon per day and if tolerated, may increase up to 2-3 tablespoons per day.  You may experience some bloating with initiation of fiber supplementation that will improve over the first month.  A good fiber trial to evaluate the effect is 3-6  months.  -- Recommend a trial of Miralax.  This is not a stimulant laxative and is safe to take on a daily basis.  Try 1 cap(s) full every day/every other day.  You can titrate this dose (increase or decrease) based on stool frequency and consistency.    2.  H. Pylori: Despite negative stool antigen, repeat upper endoscopy shows positive H. pylori infection on biopsies confirming his first treatment did not allow for eradication.  Dr. Pérez confirmed with patient's nephrologist regarding PPI therapy and its requirement and quadruple therapy to ensure eradication.  Nephrology agreed to PPI therapy for treatment and for ongoing antiacid treatment given his Lynne's of the esophagus.  We will proceed with levofloxacin quadruple therapy for 10 days.  Because his stool antigen was negative and biopsies were positive on most recent upper endoscopy, we will need to repeat endoscopic assessment to ensure eradication.  --Levofloxacin quadruple therapy times 10 days that includes: Levofloxacin 250 mg once daily, doxycycline 100 mg daily, nitazoxanide 500 mg twice daily, omeprazole 40 mg once daily.  --Upper endoscopy to be repeated in the next 8-12 weeks to ensure eradication.  This has been ordered and to be scheduled.    3.  Lynne's of the esophagus (nondysplastic), GERD without esophagitis: Encourage life long antiacid regimen.  Confirmed with patient's nephrologist that CKD may not be consistent with patient's course and patient may likely have a higher creatinine baseline based on his size.  Nephrology confirmed ongoing PPI therapy (20 mg daily) okay to continue.  --Once patient has completed H. pylori quadruple therapy, moved to omeprazole 20 mg daily.  --Surveillance with upper endoscopy should be performed every 3 years.     4.  Elevated LFTs: Patient has been evaluated by hepatology, felt to be nonalcoholic fatty liver disease.  --Encourage weight loss, frequent exercise and decrease intake of high fructose corn  syrup (especially ongoing frequency of soda pop)    5. Colorectal cancer screening: No personal family history of colon cancer.  Normal colonoscopy at outside institution.  Patient would require repeat colonoscopy at the age of 48, sooner if symptomatic.    RTC 3 months    Thank you for this consultation.  It was a pleasure to participate in the care of this patient; please contact us with any further questions.       Alvin Patel PA-C  Division of Gastroenterology, Hepatology and Nutrition  [facility]      HPI  28-year-old male with history of CKD from unknown cause (although upon review from nephrology, question this diagnosis given larger size), who presents for follow-up regarding change in bowel habits, endoscopic findings and history of H. Pylori.    IRREGULAR BOWEL PATTERN:  Briefly, the patient's history includes symptoms regards to change in bowel habits that began approximately 5 years ago.  He remembers around this time he was on vacation with extreme constipation and from that time he was unable to maintain a regular bowel pattern frequently changing from constipation to loose stools.  He mentions the story today however upon original consultation with Dr. Pérez he mentions that this actually began after eating a cheeseburger 5 years ago prompting urgent bowel movements.  He has periods of time that he feels well with normal bowel pattern followed by several weeks of loose stools.  During periods of loose stool, he will have 1-2 stools that are described as [place] 7.  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  He has yet to identify any specific dietary triggers, however has a history of drinking significant amounts of mellow yellow, at least one large bottle per day but currently reports that this is much less (no mellow yellow for the last 10 days).  There has also been a question regarding increased symptoms with milk, however  "he has been unable to complete a dairy trial as he notes that \"dairy is in everything.\"  He had a colonoscopy in the [facility] showing a normal ileum and normal colon.  Biopsy of the colon was normal.  Additional workup at [facility] included stool studies that were unremarkable including infection studies that were negative.  Spot fecal fat test was elevated and normal pancreatic elastase.      Repeat testing at this institution showed normal neutral and split fat, pancreatic elastase, fecal calprotectin, 24-hour fecal fat testing, stool alpha-1 antitrypsin, H. pylori stool antigen (although repeat endoscopy does show positive again), negative infectious studies. Normal MRE.    Currently he is having 1 formed stool per day without blood in the stool.  He notes that he is currently feeling quite well in regards to his bowel pattern.  He notes that stress is a big trigger for symptoms and he is meeting with a health psychologist today.    REFLUX + HPYLORI HX +THOMAS'S ESOPHAGUS:  Upper endoscopy at outside hospital showed grade B esophagitis.  Biopsies consistent with Thomas's esophagus and positive H. pylori.  Duodenal biopsies negative for celiac.  H. pylori previously treated with triple therapy, spiking creatinine rise in renal failure, creatinine returned back to baseline.  There was hypothesis that this may be from dehydration related to looser stools with antibiotic regimen for H. pylori.  He did not tolerate the triple antibiotic therapy for H. pylori very well but he did complete it.    Repeat upper endoscopy 11/21/18 given persistent heartburn symptoms again showed positive H. Pylori (despite stool antigen normal) and Thomas's esophagus.    He has a history of frequent reflux however today he notes that it has been much improved but when it occurs it is quite severe triggered by some specifics to include a Coca-Cola and whiskey.  Symptoms are worse at night.  Prior to the " endoscopy, patient was to continue a PPI 8 weeks prior however nephrologist was concerned regarding his kidney function so he took ranitidine 150 mg twice daily prior to the endoscopy on 11/21/2018.  He did not have heartburn symptoms during this time.  He has not used any antacid regimen for the last week and has noted some increase in heartburn.  He denies any dysphasia.    ELEVATED LFTS:  At consultation and evaluation of laboratory studies, , .  Follow-up serologic laboratory studies within normal limits.  He was evaluated by hematology, felt to be secondary to nonalcoholic fatty liver disease.  No clinical, biochemical or radiographic evidence of cirrhosis.    ROS:    No fevers or chills  No weight loss  No blurry vision, double vision or change in vision  No sore throat  No lymphadenopathy  No headache, paraesthesias, or weakness in a limb  No shortness of breath or wheezing  No chest pain or pressure  No arthralgias or myalgias  No rashes or skin changes  No odynophagia or dysphagia  No BRBPR, hematochezia, melena  No dysuria, frequency or urgency  No hot/cold intolerance or polyria  + anxiety     PROBLEM LIST  1. IBS-Mixed type  2. GERD without esophagitis  3. Barretts of the esophagus  4. Elevated LFTs  5. Hpylori    PERTINENT PAST MEDICAL HISTORY:  Past Medical History:   Diagnosis Date     Irritable bowel syndrome      Overweight        PREVIOUS SURGERIES:  Past Surgical History:   Procedure Laterality Date     TONSILLECTOMY       ALLERGIES:     Allergies   Allergen Reactions     Penicillin G Unknown     Allergy when he was very young does not know reaction       PERTINENT MEDICATIONS:    Current Outpatient Prescriptions:      acetaminophen (TYLENOL) 325 MG tablet, Take by mouth as needed, Disp: , Rfl:      calcium carbonate (TUMS) 500 MG chewable tablet, Take by mouth as needed, Disp: , Rfl:      nortriptyline (PAMELOR) 25 MG capsule, Take 1 capsule (25 mg) by mouth At Bedtime, Disp: 90  "capsule, Rfl: 1     ranitidine (ZANTAC) 150 MG tablet, Take 1 tablet (150 mg) by mouth 2 times daily, Disp: 180 tablet, Rfl: 3    SOCIAL HISTORY:  He is a romero, and he plants corn and beans.  He drinks socially alcohol.  He does not smoke.  He does not use marijuana.   Social History     Social History     Marital status:      Spouse name: N/A     Number of children: N/A     Years of education: N/A     Occupational History     Not on file.     Social History Main Topics     Smoking status: Former Smoker     Smokeless tobacco: Former User     Alcohol use 1.2 - 3.6 oz/week     1 - 3 Cans of beer, 1 - 3 Shots of liquor per week      Comment: socially     Drug use: No     Sexual activity: Not on file     Other Topics Concern     Not on file     Social History Narrative       FAMILY HISTORY:  Mother has a unknown history of liver issues.  He does not know any details.  A sister with cancer of the kidney, again he does not know any details.  The whole family has heartburn.  No history of Crohn's or ulcerative colitis.  No history of colon cancer or colon polyps.  No history of stomach cancer, esophageal cancer.  No history of celiac that he is aware of.   Family History   Problem Relation Age of Onset     Type 2 Diabetes Father      Kidney Cancer Sister      Breast Cancer Maternal Grandmother      Coronary Artery Disease Early Onset Maternal Grandfather      Liver Disease No family hx of      Colon Cancer No family hx of        Past/family/social history reviewed and no changes    PHYSICAL EXAMINATION:  Constitutional: aaox3, cooperative, pleasant, not dyspneic/diaphoretic, no acute distress  Vitals reviewed: /79  Pulse 54  Temp 98.4  F (36.9  C) (Oral)  Ht 1.825 m (5' 11.85\")  Wt 97.1 kg (214 lb)  SpO2 98%  BMI 29.14 kg/m2  Wt:   Wt Readings from Last 2 Encounters:   12/03/18 97.1 kg (214 lb)   10/23/18 97.1 kg (214 lb)      Eyes: Sclera anicteric/injected  Ears/nose/mouth/throat: Normal oropharynx " without ulcers or exudate, mucus membranes moist, hearing intact  Neck: supple, thyroid normal size  CV: No edema  Respiratory: Unlabored breathing  Lymph: No axillary, submandibular, supraclavicular or inguinal lymphadenopathy  Abd: Nondistended, +bs, no hepatosplenomegaly, nontender, no peritoneal signs  Skin: warm, perfused, no jaundice  Psych: Normal affect  MSK: Normal gait      PERTINENT STUDIES:    Documentation Only on 11/21/2018   Component Date Value Ref Range Status     Copath Report 11/21/2018    Final                    Value:Patient Name: DALLIN NINO  MR#: [id number]  Specimen #: [id number]  Collected: 11/21/2018  Received: 11/23/2018  Reported: 11/27/2018 10:07  Ordering Phy(s): RANDY LILLY    For improved result formatting, select 'View Enhanced Report Format' under   Linked Documents section.    SPECIMEN(S):  A: Duodenal biopsy, 2nd and 3rd portion  B: Duodenal biopsy, bulb  C: Stomach biopsy  D: Esophageal biopsy at 38-39 cm    FINAL DIAGNOSIS:  A. DUODENAL BIOPSY, 2ND AND 3RD PORTION:  - Duodenal mucosa with no significant histologic abnormality    B. DUODENAL BIOPSY, BULB:  - Peptic duodenitis with moderate active inflammation  - No evidence of H. pylori like organisms identified on   immunohistochemical staining    C. STOMACH BIOPSY:  - Moderate chronic active gastritis  - H. pylori like organisms identified on immunohistochemical staining  - Negative for intestinal metaplasia or dysplasia    D. ESOPHAGEAL BIOPSY AT 38-39 CM:  - Metaplastic columnar epithelium with goblet cells, c                          onsistent with   Lynne's esophagus  - Negative for dysplasia    I have personally reviewed all specimens and/or slides, including the   listed special stains, and used them  with my medical judgement to determine or confirm the final diagnosis.    Electronically signed out by:    Munira Acuna M.D., PhD, Physicians    CLINICAL HISTORY:  Reflux and irregular bowel  "habits  GROSS:  A: The specimen is received in formalin with proper patient   identification, labeled \"second and third portion  of duodenum\".  The specimen consists of three tan-brown pieces of   irregular soft tissue ranging from 0.3-0.4  cm in greatest dimension.  Specimen is entirely submitted in cassette A1.    B: The specimen is received in formalin with proper patient   identification, labeled \"bulb\".  The specimen  consists of six tan-brown pieces of irregular soft tissue ranging from   0.2-0.4 cm in greatest dimension.  The  specimen is entirely submitted in cassette B1.    C: The specimen is received in formal                          in with proper patient   identification, labeled \"gastric biopsy\".  The  specimen consists of six tan-brown pieces of irregular soft tissue ranging   from 0.3-oh 0.5 cm in greatest  dimension.  The specimen is entirely submitted in cassette C1.    D: The specimen is received in formalin with proper patient   identification, labeled \"gastroesophageal  junction\".  The specimen consists of five tan-brown irregular pieces of   soft tissue ranging from 0.2-0.4 cm in  greatest dimension.  The specimen is entirely submitted in cassette D1.   (Dictated by: Grupo Toro  11/23/2018 09:02 AM)    MICROSCOPIC:  Microscopic examination was performed. An immunohistochemical stain for H.   pylori is performed on parts B and  C with proper controls. Part B is negative for H. pylori. Part C is   positive.    CPT Codes:  A: 21376-UA7  B: 93869-TO8, 49892-JJP  C: 21768-GY9, 26233-LIQ  D: 29530-KB9    TESTING LAB LOCATION:  [facility], [address]   [id number] [phone]    COLLECTION SITE:  Client: [facility]  Location: [facility] (B)    Resident  [id number]               "

## 2018-12-04 ASSESSMENT — PATIENT HEALTH QUESTIONNAIRE - PHQ9: SUM OF ALL RESPONSES TO PHQ QUESTIONS 1-9: 4

## 2018-12-04 ASSESSMENT — ANXIETY QUESTIONNAIRES: GAD7 TOTAL SCORE: 10

## 2018-12-04 NOTE — TELEPHONE ENCOUNTER
[facility] Call Center    Phone Message    May a detailed message be left on voicemail: yes    Reason for Call: Other: doxycycline hyclate (VIBRAMYCIN) 100 MG capsule also omeprazole 20 MG tablet.  Lissa was checking status on the message they left on 12/3/18 please follow up with the pharmacy. Thank you.  Action Taken: Message routed to:  [facility] ([facility] GI

## 2018-12-05 NOTE — TELEPHONE ENCOUNTER
[facility] Call Center    Phone Message    May a detailed message be left on voicemail: yes   Reason for Call: Medication Question or concern regarding medication   Prescription Clarification  Name of Medication: doxycycline hyclate (VIBRAMYCIN) 100 MG capsule  Prescribing Provider: Alvin Patel                        Pharmacy: [facility]                        What on the order needs clarification? Manju calling to confirm orders as 1x per day vs 2x per day     Name of Medication: omeprazole 20 MG tablet  Prescribing Provider: Alvin Patel                        Pharmacy: [facility]                        What on the order needs clarification?  Manju wants to dispense capsules vs tablets of this medication   Comments:  Manju states they have been trying to call for a refill for a few days now, please follow up with [facility] ask for Manju.     Action Taken: Message routed to:  [facility] ([facility] GI / Med Refill

## 2018-12-05 NOTE — TELEPHONE ENCOUNTER
Confirmed plan with Alvin Patel PA-C.    Returned call to [facility] and spoke to Manju. Yes - doxycycline is 100 mg daily x 10 days. Original fill for #20 capsules - verified per note that this is quadruple therapy x 10 days, Manju will fill 10 capsules.     Okay to switch omeprazole tablets/capsules.

## 2019-03-29 DIAGNOSIS — R79.89 ELEVATED LFTS: Primary | ICD-10-CM

## 2019-06-17 ENCOUNTER — TELEPHONE (OUTPATIENT)
Dept: GASTROENTEROLOGY | Facility: OUTPATIENT CENTER | Age: 29
End: 2019-06-17